# Patient Record
Sex: FEMALE | Race: WHITE | NOT HISPANIC OR LATINO | Employment: OTHER | URBAN - METROPOLITAN AREA
[De-identification: names, ages, dates, MRNs, and addresses within clinical notes are randomized per-mention and may not be internally consistent; named-entity substitution may affect disease eponyms.]

---

## 2017-01-12 ENCOUNTER — HOSPITAL ENCOUNTER (OUTPATIENT)
Dept: SLEEP MEDICINE | Facility: HOSPITAL | Age: 57
Discharge: HOME | End: 2017-01-12
Attending: INTERNAL MEDICINE | Admitting: INTERNAL MEDICINE

## 2017-01-17 ENCOUNTER — HOSPITAL ENCOUNTER (OUTPATIENT)
Dept: RESPIRATORY THERAPY | Facility: HOSPITAL | Age: 57
Discharge: HOME | End: 2017-01-17
Attending: INTERNAL MEDICINE | Admitting: INTERNAL MEDICINE

## 2017-04-17 ENCOUNTER — HOSPITAL ENCOUNTER (OUTPATIENT)
Dept: SLEEP MEDICINE | Facility: HOSPITAL | Age: 57
Discharge: HOME | End: 2017-04-17
Attending: INTERNAL MEDICINE | Admitting: INTERNAL MEDICINE

## 2017-07-17 ENCOUNTER — HOSPITAL ENCOUNTER (OUTPATIENT)
Dept: SLEEP MEDICINE | Facility: HOSPITAL | Age: 57
Discharge: HOME | End: 2017-07-17
Attending: INTERNAL MEDICINE | Admitting: INTERNAL MEDICINE

## 2018-02-14 ENCOUNTER — HOSPITAL ENCOUNTER (OUTPATIENT)
Dept: PREOP | Facility: HOSPITAL | Age: 58
Setting detail: HOSPITAL OUTPATIENT SURGERY
Discharge: HOME | End: 2018-02-14
Attending: INTERNAL MEDICINE | Admitting: INTERNAL MEDICINE

## 2018-02-14 LAB
AFB STAIN: NORMAL
B PERT DNA SPEC QL NAA+PROBE: NOT DETECTED
BACTERIA SPEC AEROBE CULT: NORMAL
C PNEUM DNA NPH QL NAA+NON-PROBE: NOT DETECTED
CONCENTRATED SMEAR: (no result)
CONV DIRECT SMEAR: NORMAL
CONV GRANULOCYTES, FLUID: 46 %
CONV RESPNL SPECIMEN: NORMAL
FLUAV H1 2009 PAND RNA NPH QL NAA+PROBE: NOT DETECTED
FLUAV H1 HA GENE NPH QL NAA+PROBE: NOT DETECTED
FLUAV H3 RNA NPH QL NAA+PROBE: NOT DETECTED
FLUAV SUBTYP SPEC NAA+PROBE: NOT DETECTED
FLUBV RNA ISLT QL NAA+PROBE: NOT DETECTED
GRAM STN SPEC: NORMAL
HADV DNA SPEC NAA+PROBE: NOT DETECTED
HCOV 229E RNA SPEC QL NAA+PROBE: NOT DETECTED
HCOV HKU1 RNA SPEC QL NAA+PROBE: NOT DETECTED
HCOV NL63 RNA SPEC QL NAA+PROBE: NOT DETECTED
HCOV OC43 RNA SPEC QL NAA+PROBE: NOT DETECTED
HMPV RNA NPH QL NAA+NON-PROBE: NOT DETECTED
HPIV1 RNA ISLT QL NAA+PROBE: NOT DETECTED
HPIV2 RNA SPEC QL NAA+PROBE: NOT DETECTED
HPIV3 RNA NPH QL NAA+PROBE: NOT DETECTED
HPIV4 P GENE NPH QL NAA+PROBE: NOT DETECTED
LYMPHOCYTES NFR FLD MANUAL: 11 %
Lab: NORMAL
M PNEUMO IGG SER IA-ACNC: NOT DETECTED
MESOTHL CELL NFR FLD MANUAL: 43 %
MICRO REPORT STATUS: NORMAL
RHINOVIRUS RNA SPEC NAA+PROBE: NOT DETECTED
RSV RNA NPH QL NAA+NON-PROBE: NOT DETECTED
SPECIMEN SOURCE: (no result)
SPECIMEN SOURCE: NORMAL
WBC # FLD MANUAL: (no result) {CELLS}/UL

## 2020-07-15 ENCOUNTER — HOSPITAL ENCOUNTER (EMERGENCY)
Facility: HOSPITAL | Age: 60
Discharge: HOME | End: 2020-07-15
Attending: EMERGENCY MEDICINE | Admitting: EMERGENCY MEDICINE

## 2020-07-15 ENCOUNTER — APPOINTMENT (OUTPATIENT)
Dept: CT IMAGING | Facility: HOSPITAL | Age: 60
End: 2020-07-15

## 2020-07-15 VITALS
WEIGHT: 83.11 LBS | OXYGEN SATURATION: 96 % | HEIGHT: 62 IN | SYSTOLIC BLOOD PRESSURE: 111 MMHG | RESPIRATION RATE: 18 BRPM | DIASTOLIC BLOOD PRESSURE: 33 MMHG | HEART RATE: 80 BPM | BODY MASS INDEX: 15.29 KG/M2 | TEMPERATURE: 98.9 F

## 2020-07-15 DIAGNOSIS — R11.0 NAUSEA: ICD-10-CM

## 2020-07-15 DIAGNOSIS — R10.84 GENERALIZED ABDOMINAL PAIN: Primary | ICD-10-CM

## 2020-07-15 LAB
ANION GAP SERPL CALCULATED.3IONS-SCNC: 13 MMOL/L (ref 5–15)
BASOPHILS # BLD AUTO: 0.2 10*3/MM3 (ref 0–0.2)
BASOPHILS NFR BLD AUTO: 2.5 % (ref 0–1.5)
BILIRUB UR QL STRIP: NEGATIVE
BUN SERPL-MCNC: 3 MG/DL (ref 8–23)
BUN SERPL-MCNC: ABNORMAL MG/DL
BUN/CREAT SERPL: ABNORMAL
CALCIUM SPEC-SCNC: 9.1 MG/DL (ref 8.6–10.5)
CHLORIDE SERPL-SCNC: 99 MMOL/L (ref 98–107)
CLARITY UR: CLEAR
CO2 SERPL-SCNC: 22 MMOL/L (ref 22–29)
COLOR UR: YELLOW
CREAT SERPL-MCNC: 0.74 MG/DL (ref 0.57–1)
DEPRECATED RDW RBC AUTO: 46.4 FL (ref 37–54)
EOSINOPHIL # BLD AUTO: 0.1 10*3/MM3 (ref 0–0.4)
EOSINOPHIL NFR BLD AUTO: 1.7 % (ref 0.3–6.2)
ERYTHROCYTE [DISTWIDTH] IN BLOOD BY AUTOMATED COUNT: 14.3 % (ref 12.3–15.4)
GFR SERPL CREATININE-BSD FRML MDRD: 80 ML/MIN/1.73
GLUCOSE SERPL-MCNC: 93 MG/DL (ref 65–99)
GLUCOSE UR STRIP-MCNC: NEGATIVE MG/DL
HCT VFR BLD AUTO: 41.4 % (ref 34–46.6)
HGB BLD-MCNC: 13.8 G/DL (ref 12–15.9)
HGB UR QL STRIP.AUTO: NEGATIVE
KETONES UR QL STRIP: NEGATIVE
LEUKOCYTE ESTERASE UR QL STRIP.AUTO: NEGATIVE
LYMPHOCYTES # BLD AUTO: 2.1 10*3/MM3 (ref 0.7–3.1)
LYMPHOCYTES NFR BLD AUTO: 32.3 % (ref 19.6–45.3)
MCH RBC QN AUTO: 31.2 PG (ref 26.6–33)
MCHC RBC AUTO-ENTMCNC: 33.3 G/DL (ref 31.5–35.7)
MCV RBC AUTO: 93.7 FL (ref 79–97)
MONOCYTES # BLD AUTO: 0.5 10*3/MM3 (ref 0.1–0.9)
MONOCYTES NFR BLD AUTO: 7.4 % (ref 5–12)
NEUTROPHILS NFR BLD AUTO: 3.6 10*3/MM3 (ref 1.7–7)
NEUTROPHILS NFR BLD AUTO: 56.1 % (ref 42.7–76)
NITRITE UR QL STRIP: NEGATIVE
NRBC BLD AUTO-RTO: 0.1 /100 WBC (ref 0–0.2)
PH UR STRIP.AUTO: 6.5 [PH] (ref 5–8)
PLATELET # BLD AUTO: 332 10*3/MM3 (ref 140–450)
PMV BLD AUTO: 7.2 FL (ref 6–12)
POTASSIUM SERPL-SCNC: 4.4 MMOL/L (ref 3.5–5.2)
PROT UR QL STRIP: NEGATIVE
RBC # BLD AUTO: 4.42 10*6/MM3 (ref 3.77–5.28)
SODIUM SERPL-SCNC: 134 MMOL/L (ref 136–145)
SP GR UR STRIP: 1.01 (ref 1–1.03)
UROBILINOGEN UR QL STRIP: NORMAL
WBC # BLD AUTO: 6.4 10*3/MM3 (ref 3.4–10.8)

## 2020-07-15 PROCEDURE — 81003 URINALYSIS AUTO W/O SCOPE: CPT | Performed by: EMERGENCY MEDICINE

## 2020-07-15 PROCEDURE — 85025 COMPLETE CBC W/AUTO DIFF WBC: CPT | Performed by: EMERGENCY MEDICINE

## 2020-07-15 PROCEDURE — 80048 BASIC METABOLIC PNL TOTAL CA: CPT | Performed by: EMERGENCY MEDICINE

## 2020-07-15 PROCEDURE — 74176 CT ABD & PELVIS W/O CONTRAST: CPT

## 2020-07-15 PROCEDURE — 99283 EMERGENCY DEPT VISIT LOW MDM: CPT

## 2020-07-15 RX ORDER — NAPROXEN 375 MG/1
375 TABLET ORAL 2 TIMES DAILY PRN
Qty: 14 TABLET | Refills: 0 | Status: SHIPPED | OUTPATIENT
Start: 2020-07-15 | End: 2022-12-05

## 2020-07-15 RX ORDER — SODIUM CHLORIDE 0.9 % (FLUSH) 0.9 %
10 SYRINGE (ML) INJECTION AS NEEDED
Status: DISCONTINUED | OUTPATIENT
Start: 2020-07-15 | End: 2020-07-16 | Stop reason: HOSPADM

## 2020-07-16 NOTE — ED PROVIDER NOTES
Subjective   Patient is a 59-year-old female complaining of low abdominal pain.  She states she has had over 24 hours.  The pain was initially intense but is very mild now.  She has mild nausea but is improved patient has cough fever Bondar other complaint          Review of Systems  For headache ears no cough fever chest pain shortness of breath vomiting diarrhea dysuria or other complaint  Past Medical History:   Diagnosis Date   • COPD (chronic obstructive pulmonary disease) (CMS/HCC)        No Known Allergies    Past Surgical History:   Procedure Laterality Date   •  SECTION     • SPLENECTOMY         History reviewed. No pertinent family history.    Social History     Socioeconomic History   • Marital status:      Spouse name: Not on file   • Number of children: Not on file   • Years of education: Not on file   • Highest education level: Not on file   Tobacco Use   • Smoking status: Never Smoker   Substance and Sexual Activity   • Alcohol use: Not Currently   • Drug use: Not Currently   • Sexual activity: Defer           Objective   Physical Exam  HEENT exam shows TMs to be clear.  Oropharynx comers but sclerae nonicteric.  Neck has no adenopathy JVD or bruits.  Lungs are clear.  Heart has regular rate rhythm without murmur or gallop.  Chest is nontender.  Abdomen soft nontender.  Extremity exam is unremarkable.  Procedures           ED Course            Results for orders placed or performed during the hospital encounter of 07/15/20   Basic Metabolic Panel   Result Value Ref Range    Glucose 93 65 - 99 mg/dL    BUN      Creatinine 0.74 0.57 - 1.00 mg/dL    Sodium 134 (L) 136 - 145 mmol/L    Potassium 4.4 3.5 - 5.2 mmol/L    Chloride 99 98 - 107 mmol/L    CO2 22.0 22.0 - 29.0 mmol/L    Calcium 9.1 8.6 - 10.5 mg/dL    eGFR Non African Amer 80 >60 mL/min/1.73    BUN/Creatinine Ratio      Anion Gap 13.0 5.0 - 15.0 mmol/L   Urinalysis With Culture If Indicated - Urine, Catheter   Result Value Ref  Range    Color, UA Yellow Yellow, Straw    Appearance, UA Clear Clear    pH, UA 6.5 5.0 - 8.0    Specific Gravity, UA 1.008 1.005 - 1.030    Glucose, UA Negative Negative    Ketones, UA Negative Negative    Bilirubin, UA Negative Negative    Blood, UA Negative Negative    Protein, UA Negative Negative    Leuk Esterase, UA Negative Negative    Nitrite, UA Negative Negative    Urobilinogen, UA 0.2 E.U./dL 0.2 - 1.0 E.U./dL   CBC Auto Differential   Result Value Ref Range    WBC 6.40 3.40 - 10.80 10*3/mm3    RBC 4.42 3.77 - 5.28 10*6/mm3    Hemoglobin 13.8 12.0 - 15.9 g/dL    Hematocrit 41.4 34.0 - 46.6 %    MCV 93.7 79.0 - 97.0 fL    MCH 31.2 26.6 - 33.0 pg    MCHC 33.3 31.5 - 35.7 g/dL    RDW 14.3 12.3 - 15.4 %    RDW-SD 46.4 37.0 - 54.0 fl    MPV 7.2 6.0 - 12.0 fL    Platelets 332 140 - 450 10*3/mm3    Neutrophil % 56.1 42.7 - 76.0 %    Lymphocyte % 32.3 19.6 - 45.3 %    Monocyte % 7.4 5.0 - 12.0 %    Eosinophil % 1.7 0.3 - 6.2 %    Basophil % 2.5 (H) 0.0 - 1.5 %    Neutrophils, Absolute 3.60 1.70 - 7.00 10*3/mm3    Lymphocytes, Absolute 2.10 0.70 - 3.10 10*3/mm3    Monocytes, Absolute 0.50 0.10 - 0.90 10*3/mm3    Eosinophils, Absolute 0.10 0.00 - 0.40 10*3/mm3    Basophils, Absolute 0.20 0.00 - 0.20 10*3/mm3    nRBC 0.1 0.0 - 0.2 /100 WBC   BUN   Result Value Ref Range    BUN 3 (L) 8 - 23 mg/dL     Ct Abdomen Pelvis Without Contrast    Result Date: 7/15/2020  1. No evidence of urolithiasis or hydronephrosis. 2. No evidence of bowel obstruction or acute bowel inflammation. 3. Mild colonic stool burden. 4. Splenosis within the left upper quadrant.    Electronically Signed By-Joey Suarez On:7/15/2020 9:38 PM This report was finalized on [id number] by  Joey Suarez, .                                      MDM  Number of Diagnoses or Management Options  Diagnosis management comments: Patient has a benign physical exam.  She has no evidence acute infectious process or metabolic abnormality.  Reexam she is  nearly pain-free.  She will be discharged with placed on Naprosyn.  She will follow with MD for recheck as needed.    Risk of Complications, Morbidity, and/or Mortality  Presenting problems: high  Diagnostic procedures: high  Management options: high    Patient Progress  Patient progress: stable      Final diagnoses:   Generalized abdominal pain   Nausea            Matt Wright MD  07/15/20 [id number]

## 2020-08-27 RX ORDER — METOPROLOL SUCCINATE 25 MG/1
TABLET, EXTENDED RELEASE ORAL
Qty: 90 TABLET | Refills: 1 | Status: SHIPPED | OUTPATIENT
Start: 2020-08-27

## 2021-02-22 RX ORDER — METOPROLOL SUCCINATE 25 MG/1
TABLET, EXTENDED RELEASE ORAL
Qty: 90 TABLET | Refills: 1 | OUTPATIENT
Start: 2021-02-22

## 2022-12-04 ENCOUNTER — APPOINTMENT (OUTPATIENT)
Dept: GENERAL RADIOLOGY | Facility: HOSPITAL | Age: 62
End: 2022-12-04

## 2022-12-04 ENCOUNTER — HOSPITAL ENCOUNTER (INPATIENT)
Facility: HOSPITAL | Age: 62
LOS: 2 days | Discharge: HOME | End: 2022-12-07
Attending: EMERGENCY MEDICINE | Admitting: INTERNAL MEDICINE

## 2022-12-04 DIAGNOSIS — J44.1 COPD WITH ACUTE EXACERBATION: Primary | ICD-10-CM

## 2022-12-04 DIAGNOSIS — R09.02 HYPOXIA: ICD-10-CM

## 2022-12-04 DIAGNOSIS — T17.998A MUCUS PLUG IN RESPIRATORY TRACT: ICD-10-CM

## 2022-12-04 LAB
ALBUMIN SERPL-MCNC: 3.7 G/DL (ref 3.5–5.2)
ALBUMIN/GLOB SERPL: 1.4 G/DL
ALP SERPL-CCNC: 57 U/L (ref 39–117)
ALT SERPL W P-5'-P-CCNC: 16 U/L (ref 1–33)
ANION GAP SERPL CALCULATED.3IONS-SCNC: 10 MMOL/L (ref 5–15)
ARTERIAL PATENCY WRIST A: POSITIVE
AST SERPL-CCNC: 24 U/L (ref 1–32)
ATMOSPHERIC PRESS: ABNORMAL MM[HG]
B PARAPERT DNA SPEC QL NAA+PROBE: NOT DETECTED
B PERT DNA SPEC QL NAA+PROBE: NOT DETECTED
BASE EXCESS BLDA CALC-SCNC: 2.2 MMOL/L (ref 0–3)
BASOPHILS # BLD AUTO: 0.1 10*3/MM3 (ref 0–0.2)
BASOPHILS NFR BLD AUTO: 0.7 % (ref 0–1.5)
BDY SITE: ABNORMAL
BILIRUB SERPL-MCNC: <0.2 MG/DL (ref 0–1.2)
BUN SERPL-MCNC: 8 MG/DL (ref 8–23)
BUN/CREAT SERPL: 11.4 (ref 7–25)
C PNEUM DNA NPH QL NAA+NON-PROBE: NOT DETECTED
CALCIUM SPEC-SCNC: 8.4 MG/DL (ref 8.6–10.5)
CHLORIDE SERPL-SCNC: 100 MMOL/L (ref 98–107)
CO2 BLDA-SCNC: 28.5 MMOL/L (ref 22–29)
CO2 SERPL-SCNC: 25 MMOL/L (ref 22–29)
CREAT SERPL-MCNC: 0.7 MG/DL (ref 0.57–1)
DEPRECATED RDW RBC AUTO: 48.6 FL (ref 37–54)
EGFRCR SERPLBLD CKD-EPI 2021: 97.3 ML/MIN/1.73
EOSINOPHIL # BLD AUTO: 0 10*3/MM3 (ref 0–0.4)
EOSINOPHIL NFR BLD AUTO: 0.3 % (ref 0.3–6.2)
ERYTHROCYTE [DISTWIDTH] IN BLOOD BY AUTOMATED COUNT: 14.2 % (ref 12.3–15.4)
FLUAV H1 2009 PAND RNA NPH QL NAA+PROBE: NOT DETECTED
FLUAV H1 HA GENE NPH QL NAA+PROBE: NOT DETECTED
FLUAV H3 RNA NPH QL NAA+PROBE: NOT DETECTED
FLUAV SUBTYP SPEC NAA+PROBE: NOT DETECTED
FLUBV RNA ISLT QL NAA+PROBE: NOT DETECTED
GLOBULIN UR ELPH-MCNC: 2.6 GM/DL
GLUCOSE SERPL-MCNC: 102 MG/DL (ref 65–99)
HADV DNA SPEC NAA+PROBE: NOT DETECTED
HCO3 BLDA-SCNC: 27.2 MMOL/L (ref 21–28)
HCOV 229E RNA SPEC QL NAA+PROBE: NOT DETECTED
HCOV HKU1 RNA SPEC QL NAA+PROBE: NOT DETECTED
HCOV NL63 RNA SPEC QL NAA+PROBE: NOT DETECTED
HCOV OC43 RNA SPEC QL NAA+PROBE: NOT DETECTED
HCT VFR BLD AUTO: 42.1 % (ref 34–46.6)
HEMODILUTION: NO
HGB BLD-MCNC: 14.1 G/DL (ref 12–15.9)
HMPV RNA NPH QL NAA+NON-PROBE: NOT DETECTED
HPIV1 RNA ISLT QL NAA+PROBE: NOT DETECTED
HPIV2 RNA SPEC QL NAA+PROBE: NOT DETECTED
HPIV3 RNA NPH QL NAA+PROBE: NOT DETECTED
HPIV4 P GENE NPH QL NAA+PROBE: NOT DETECTED
INHALED O2 CONCENTRATION: 32 %
LYMPHOCYTES # BLD AUTO: 1.3 10*3/MM3 (ref 0.7–3.1)
LYMPHOCYTES NFR BLD AUTO: 16.3 % (ref 19.6–45.3)
M PNEUMO IGG SER IA-ACNC: NOT DETECTED
MCH RBC QN AUTO: 31.4 PG (ref 26.6–33)
MCHC RBC AUTO-ENTMCNC: 33.5 G/DL (ref 31.5–35.7)
MCV RBC AUTO: 93.7 FL (ref 79–97)
MODALITY: ABNORMAL
MONOCYTES # BLD AUTO: 0.6 10*3/MM3 (ref 0.1–0.9)
MONOCYTES NFR BLD AUTO: 7 % (ref 5–12)
NEUTROPHILS NFR BLD AUTO: 6.2 10*3/MM3 (ref 1.7–7)
NEUTROPHILS NFR BLD AUTO: 75.7 % (ref 42.7–76)
NRBC BLD AUTO-RTO: 0.2 /100 WBC (ref 0–0.2)
NT-PROBNP SERPL-MCNC: 171.6 PG/ML (ref 0–900)
PCO2 BLDA: 42.7 MM HG (ref 35–48)
PH BLDA: 7.41 PH UNITS (ref 7.35–7.45)
PLATELET # BLD AUTO: 284 10*3/MM3 (ref 140–450)
PMV BLD AUTO: 7.4 FL (ref 6–12)
PO2 BLDA: 74.2 MM HG (ref 83–108)
POTASSIUM SERPL-SCNC: 4.4 MMOL/L (ref 3.5–5.2)
PROT SERPL-MCNC: 6.3 G/DL (ref 6–8.5)
RBC # BLD AUTO: 4.49 10*6/MM3 (ref 3.77–5.28)
RHINOVIRUS RNA SPEC NAA+PROBE: NOT DETECTED
RSV RNA NPH QL NAA+NON-PROBE: NOT DETECTED
SAO2 % BLDCOA: 94.8 % (ref 94–98)
SARS-COV-2 RNA NPH QL NAA+NON-PROBE: NOT DETECTED
SODIUM SERPL-SCNC: 135 MMOL/L (ref 136–145)
TROPONIN T SERPL-MCNC: <0.01 NG/ML (ref 0–0.03)
WBC NRBC COR # BLD: 8.2 10*3/MM3 (ref 3.4–10.8)

## 2022-12-04 PROCEDURE — 99285 EMERGENCY DEPT VISIT HI MDM: CPT

## 2022-12-04 PROCEDURE — 82803 BLOOD GASES ANY COMBINATION: CPT

## 2022-12-04 PROCEDURE — 94799 UNLISTED PULMONARY SVC/PX: CPT

## 2022-12-04 PROCEDURE — 0202U NFCT DS 22 TRGT SARS-COV-2: CPT | Performed by: PHYSICIAN ASSISTANT

## 2022-12-04 PROCEDURE — 93005 ELECTROCARDIOGRAM TRACING: CPT | Performed by: EMERGENCY MEDICINE

## 2022-12-04 PROCEDURE — 71045 X-RAY EXAM CHEST 1 VIEW: CPT

## 2022-12-04 PROCEDURE — 94640 AIRWAY INHALATION TREATMENT: CPT

## 2022-12-04 PROCEDURE — 83880 ASSAY OF NATRIURETIC PEPTIDE: CPT | Performed by: PHYSICIAN ASSISTANT

## 2022-12-04 PROCEDURE — 84484 ASSAY OF TROPONIN QUANT: CPT | Performed by: PHYSICIAN ASSISTANT

## 2022-12-04 PROCEDURE — 80053 COMPREHEN METABOLIC PANEL: CPT | Performed by: PHYSICIAN ASSISTANT

## 2022-12-04 PROCEDURE — 36600 WITHDRAWAL OF ARTERIAL BLOOD: CPT

## 2022-12-04 PROCEDURE — 85025 COMPLETE CBC W/AUTO DIFF WBC: CPT | Performed by: PHYSICIAN ASSISTANT

## 2022-12-04 PROCEDURE — 25010000002 METHYLPREDNISOLONE PER 125 MG: Performed by: PHYSICIAN ASSISTANT

## 2022-12-04 RX ORDER — SODIUM CHLORIDE 0.9 % (FLUSH) 0.9 %
10 SYRINGE (ML) INJECTION AS NEEDED
Status: DISCONTINUED | OUTPATIENT
Start: 2022-12-04 | End: 2022-12-07 | Stop reason: HOSPADM

## 2022-12-04 RX ORDER — METHYLPREDNISOLONE SODIUM SUCCINATE 125 MG/2ML
125 INJECTION, POWDER, LYOPHILIZED, FOR SOLUTION INTRAMUSCULAR; INTRAVENOUS ONCE
Status: COMPLETED | OUTPATIENT
Start: 2022-12-04 | End: 2022-12-04

## 2022-12-04 RX ORDER — ALBUTEROL SULFATE 90 UG/1
2 AEROSOL, METERED RESPIRATORY (INHALATION) ONCE
Status: DISCONTINUED | OUTPATIENT
Start: 2022-12-04 | End: 2022-12-07 | Stop reason: HOSPADM

## 2022-12-04 RX ORDER — ALBUTEROL SULFATE 2.5 MG/3ML
2.5 SOLUTION RESPIRATORY (INHALATION) ONCE
Status: COMPLETED | OUTPATIENT
Start: 2022-12-04 | End: 2022-12-04

## 2022-12-04 RX ADMIN — ALBUTEROL SULFATE 2.5 MG: 2.5 SOLUTION RESPIRATORY (INHALATION) at 23:58

## 2022-12-04 RX ADMIN — METHYLPREDNISOLONE SODIUM SUCCINATE 125 MG: 125 INJECTION, POWDER, FOR SOLUTION INTRAMUSCULAR; INTRAVENOUS at 22:40

## 2022-12-05 PROBLEM — J96.20 ACUTE-ON-CHRONIC RESPIRATORY FAILURE (HCC): Status: ACTIVE | Noted: 2022-12-05

## 2022-12-05 PROBLEM — F32.A MILD DEPRESSION: Chronic | Status: ACTIVE | Noted: 2022-12-05

## 2022-12-05 PROBLEM — J44.1 COPD WITH ACUTE EXACERBATION: Status: ACTIVE | Noted: 2022-12-05

## 2022-12-05 PROBLEM — J96.21 ACUTE ON CHRONIC RESPIRATORY FAILURE WITH HYPOXIA (HCC): Status: ACTIVE | Noted: 2022-12-05

## 2022-12-05 PROBLEM — J96.20 ACUTE-ON-CHRONIC RESPIRATORY FAILURE (HCC): Status: RESOLVED | Noted: 2022-12-05 | Resolved: 2022-12-05

## 2022-12-05 PROBLEM — R63.6 UNDERWEIGHT: Status: ACTIVE | Noted: 2022-12-05

## 2022-12-05 PROBLEM — I10 HYPERTENSION: Status: ACTIVE | Noted: 2022-12-05

## 2022-12-05 PROBLEM — T17.998A MUCUS PLUG IN RESPIRATORY TRACT: Status: ACTIVE | Noted: 2022-12-04

## 2022-12-05 LAB
ANION GAP SERPL CALCULATED.3IONS-SCNC: 12 MMOL/L (ref 5–15)
BASOPHILS # BLD AUTO: 0 10*3/MM3 (ref 0–0.2)
BASOPHILS NFR BLD AUTO: 0.2 % (ref 0–1.5)
BUN SERPL-MCNC: 6 MG/DL (ref 8–23)
BUN/CREAT SERPL: 9.4 (ref 7–25)
CALCIUM SPEC-SCNC: 8.7 MG/DL (ref 8.6–10.5)
CHLORIDE SERPL-SCNC: 97 MMOL/L (ref 98–107)
CO2 SERPL-SCNC: 25 MMOL/L (ref 22–29)
CREAT SERPL-MCNC: 0.64 MG/DL (ref 0.57–1)
DEPRECATED RDW RBC AUTO: 45.1 FL (ref 37–54)
EGFRCR SERPLBLD CKD-EPI 2021: 99.4 ML/MIN/1.73
EOSINOPHIL # BLD AUTO: 0 10*3/MM3 (ref 0–0.4)
EOSINOPHIL NFR BLD AUTO: 0 % (ref 0.3–6.2)
ERYTHROCYTE [DISTWIDTH] IN BLOOD BY AUTOMATED COUNT: 13.8 % (ref 12.3–15.4)
GLUCOSE SERPL-MCNC: 160 MG/DL (ref 65–99)
HCT VFR BLD AUTO: 43.9 % (ref 34–46.6)
HGB BLD-MCNC: 14.2 G/DL (ref 12–15.9)
LYMPHOCYTES # BLD AUTO: 0.6 10*3/MM3 (ref 0.7–3.1)
LYMPHOCYTES NFR BLD AUTO: 5.7 % (ref 19.6–45.3)
MCH RBC QN AUTO: 30.9 PG (ref 26.6–33)
MCHC RBC AUTO-ENTMCNC: 32.4 G/DL (ref 31.5–35.7)
MCV RBC AUTO: 95.5 FL (ref 79–97)
MONOCYTES # BLD AUTO: 0.2 10*3/MM3 (ref 0.1–0.9)
MONOCYTES NFR BLD AUTO: 1.6 % (ref 5–12)
NEUTROPHILS NFR BLD AUTO: 9 10*3/MM3 (ref 1.7–7)
NEUTROPHILS NFR BLD AUTO: 92.5 % (ref 42.7–76)
NRBC BLD AUTO-RTO: 0 /100 WBC (ref 0–0.2)
PLATELET # BLD AUTO: 291 10*3/MM3 (ref 140–450)
PMV BLD AUTO: 8.2 FL (ref 6–12)
POTASSIUM SERPL-SCNC: 4.5 MMOL/L (ref 3.5–5.2)
RBC # BLD AUTO: 4.59 10*6/MM3 (ref 3.77–5.28)
SODIUM SERPL-SCNC: 134 MMOL/L (ref 136–145)
WBC NRBC COR # BLD: 9.8 10*3/MM3 (ref 3.4–10.8)

## 2022-12-05 PROCEDURE — 80048 BASIC METABOLIC PNL TOTAL CA: CPT

## 2022-12-05 PROCEDURE — 85025 COMPLETE CBC W/AUTO DIFF WBC: CPT

## 2022-12-05 PROCEDURE — 25010000002 METHYLPREDNISOLONE PER 40 MG

## 2022-12-05 PROCEDURE — 36415 COLL VENOUS BLD VENIPUNCTURE: CPT

## 2022-12-05 PROCEDURE — 94761 N-INVAS EAR/PLS OXIMETRY MLT: CPT

## 2022-12-05 PROCEDURE — 25010000002 HEPARIN (PORCINE) PER 1000 UNITS

## 2022-12-05 PROCEDURE — 94799 UNLISTED PULMONARY SVC/PX: CPT

## 2022-12-05 RX ORDER — DOXYCYCLINE 100 MG/1
100 TABLET ORAL EVERY 12 HOURS SCHEDULED
Status: DISCONTINUED | OUTPATIENT
Start: 2022-12-05 | End: 2022-12-07 | Stop reason: HOSPADM

## 2022-12-05 RX ORDER — CITALOPRAM 20 MG/1
40 TABLET ORAL DAILY
Status: DISCONTINUED | OUTPATIENT
Start: 2022-12-05 | End: 2022-12-07 | Stop reason: HOSPADM

## 2022-12-05 RX ORDER — SODIUM CHLORIDE 0.9 % (FLUSH) 0.9 %
10 SYRINGE (ML) INJECTION EVERY 12 HOURS SCHEDULED
Status: DISCONTINUED | OUTPATIENT
Start: 2022-12-05 | End: 2022-12-07 | Stop reason: HOSPADM

## 2022-12-05 RX ORDER — HEPARIN SODIUM 5000 [USP'U]/ML
5000 INJECTION, SOLUTION INTRAVENOUS; SUBCUTANEOUS EVERY 12 HOURS SCHEDULED
Status: DISCONTINUED | OUTPATIENT
Start: 2022-12-05 | End: 2022-12-07 | Stop reason: HOSPADM

## 2022-12-05 RX ORDER — ACETAMINOPHEN 325 MG/1
650 TABLET ORAL EVERY 4 HOURS PRN
Status: DISCONTINUED | OUTPATIENT
Start: 2022-12-05 | End: 2022-12-07 | Stop reason: HOSPADM

## 2022-12-05 RX ORDER — CHOLECALCIFEROL (VITAMIN D3) 125 MCG
5 CAPSULE ORAL NIGHTLY PRN
Status: DISCONTINUED | OUTPATIENT
Start: 2022-12-05 | End: 2022-12-07 | Stop reason: HOSPADM

## 2022-12-05 RX ORDER — ACETAMINOPHEN 160 MG/5ML
650 SOLUTION ORAL EVERY 4 HOURS PRN
Status: DISCONTINUED | OUTPATIENT
Start: 2022-12-05 | End: 2022-12-07 | Stop reason: HOSPADM

## 2022-12-05 RX ORDER — BISACODYL 10 MG
10 SUPPOSITORY, RECTAL RECTAL DAILY PRN
Status: DISCONTINUED | OUTPATIENT
Start: 2022-12-05 | End: 2022-12-07 | Stop reason: HOSPADM

## 2022-12-05 RX ORDER — ONDANSETRON 4 MG/1
4 TABLET, FILM COATED ORAL EVERY 6 HOURS PRN
Status: DISCONTINUED | OUTPATIENT
Start: 2022-12-05 | End: 2022-12-07 | Stop reason: HOSPADM

## 2022-12-05 RX ORDER — SODIUM CHLORIDE 9 MG/ML
40 INJECTION, SOLUTION INTRAVENOUS AS NEEDED
Status: DISCONTINUED | OUTPATIENT
Start: 2022-12-05 | End: 2022-12-07 | Stop reason: HOSPADM

## 2022-12-05 RX ORDER — METHYLPREDNISOLONE SODIUM SUCCINATE 40 MG/ML
40 INJECTION, POWDER, LYOPHILIZED, FOR SOLUTION INTRAMUSCULAR; INTRAVENOUS DAILY
Status: DISCONTINUED | OUTPATIENT
Start: 2022-12-05 | End: 2022-12-07

## 2022-12-05 RX ORDER — IPRATROPIUM BROMIDE AND ALBUTEROL SULFATE 2.5; .5 MG/3ML; MG/3ML
3 SOLUTION RESPIRATORY (INHALATION) EVERY 4 HOURS PRN
Status: DISCONTINUED | OUTPATIENT
Start: 2022-12-05 | End: 2022-12-07 | Stop reason: HOSPADM

## 2022-12-05 RX ORDER — AMOXICILLIN 250 MG
2 CAPSULE ORAL 2 TIMES DAILY
Status: DISCONTINUED | OUTPATIENT
Start: 2022-12-05 | End: 2022-12-07 | Stop reason: HOSPADM

## 2022-12-05 RX ORDER — SODIUM CHLORIDE 0.9 % (FLUSH) 0.9 %
10 SYRINGE (ML) INJECTION AS NEEDED
Status: DISCONTINUED | OUTPATIENT
Start: 2022-12-05 | End: 2022-12-07 | Stop reason: HOSPADM

## 2022-12-05 RX ORDER — NITROGLYCERIN 0.4 MG/1
0.4 TABLET SUBLINGUAL
Status: DISCONTINUED | OUTPATIENT
Start: 2022-12-05 | End: 2022-12-07 | Stop reason: HOSPADM

## 2022-12-05 RX ORDER — POLYETHYLENE GLYCOL 3350 17 G/17G
17 POWDER, FOR SOLUTION ORAL DAILY PRN
Status: DISCONTINUED | OUTPATIENT
Start: 2022-12-05 | End: 2022-12-07 | Stop reason: HOSPADM

## 2022-12-05 RX ORDER — CITALOPRAM 40 MG/1
40 TABLET ORAL DAILY
COMMUNITY

## 2022-12-05 RX ORDER — BUDESONIDE AND FORMOTEROL FUMARATE DIHYDRATE 160; 4.5 UG/1; UG/1
2 AEROSOL RESPIRATORY (INHALATION)
Status: DISCONTINUED | OUTPATIENT
Start: 2022-12-05 | End: 2022-12-07 | Stop reason: HOSPADM

## 2022-12-05 RX ORDER — BISACODYL 5 MG/1
5 TABLET, DELAYED RELEASE ORAL DAILY PRN
Status: DISCONTINUED | OUTPATIENT
Start: 2022-12-05 | End: 2022-12-07 | Stop reason: HOSPADM

## 2022-12-05 RX ORDER — ONDANSETRON 2 MG/ML
4 INJECTION INTRAMUSCULAR; INTRAVENOUS EVERY 6 HOURS PRN
Status: DISCONTINUED | OUTPATIENT
Start: 2022-12-05 | End: 2022-12-07 | Stop reason: HOSPADM

## 2022-12-05 RX ORDER — ACETAMINOPHEN 650 MG/1
650 SUPPOSITORY RECTAL EVERY 4 HOURS PRN
Status: DISCONTINUED | OUTPATIENT
Start: 2022-12-05 | End: 2022-12-07 | Stop reason: HOSPADM

## 2022-12-05 RX ADMIN — Medication 10 ML: at 08:49

## 2022-12-05 RX ADMIN — Medication 10 ML: at 21:10

## 2022-12-05 RX ADMIN — DOXYCYCLINE 100 MG: 100 TABLET ORAL at 10:10

## 2022-12-05 RX ADMIN — HEPARIN SODIUM 5000 UNITS: 5000 INJECTION INTRAVENOUS; SUBCUTANEOUS at 21:05

## 2022-12-05 RX ADMIN — CITALOPRAM HYDROBROMIDE 40 MG: 20 TABLET ORAL at 21:09

## 2022-12-05 RX ADMIN — DOXYCYCLINE 100 MG: 100 TABLET ORAL at 21:05

## 2022-12-05 RX ADMIN — SENNOSIDES AND DOCUSATE SODIUM 2 TABLET: 50; 8.6 TABLET ORAL at 21:05

## 2022-12-05 RX ADMIN — Medication 10 ML: at 02:39

## 2022-12-05 RX ADMIN — BUDESONIDE AND FORMOTEROL FUMARATE DIHYDRATE 2 PUFF: 160; 4.5 AEROSOL RESPIRATORY (INHALATION) at 07:40

## 2022-12-05 RX ADMIN — HEPARIN SODIUM 5000 UNITS: 5000 INJECTION INTRAVENOUS; SUBCUTANEOUS at 08:44

## 2022-12-05 RX ADMIN — METHYLPREDNISOLONE SODIUM SUCCINATE 40 MG: 40 INJECTION, POWDER, FOR SOLUTION INTRAMUSCULAR; INTRAVENOUS at 08:44

## 2022-12-05 RX ADMIN — SENNOSIDES AND DOCUSATE SODIUM 2 TABLET: 50; 8.6 TABLET ORAL at 08:44

## 2022-12-05 NOTE — H&P
[facility] Medicine Services  History & Physical    Patient Name: Theresa Sargent  : 1960  MRN: [id number]  Primary Care Physician:  Marito Velasquez MD  Date of admission: 2022  Date and Time of Service: 22 at 0100    Subjective      Chief Complaint: Shortness of breath    History of Present Illness: Theresa Sargent is a 62 y.o. female who presented to [facility] on 2022 complaining of shortness of breath which progressively worsened today.  She states that for the last few days she has had a headache, nonproductive cough, fever, chills, generalized weakness and decreased appetite.  She reports that around 6 years ago she was on O2 at 3 L per NC at home but has not needed it for years until today.    In the ED, her SPO2 was 83% on room air which improved to 97% on O2 at 3 L per NC.  An ABG on O2 at 3 L showed a pH of 7.412, PCO2 42.7, PO2 74.2, HCO3 27.2.  Troponin is negative, proBNP 171.6.  All other labs are unremarkable.  Respiratory panel is negative.  Chest x-ray shows no acute pneumonia.  EKG shows sinus rhythm, HR 70.  She is afebrile, blood pressure and heart rate are stable.  She was given Solu-Medrol and a DuoNeb.  Hospitalist was consulted for further care and management.    Review of Systems   Constitutional: Positive for chills, decreased appetite, fever and malaise/fatigue.   HENT: Negative.    Eyes: Negative.    Cardiovascular: Negative.    Respiratory: Positive for cough and shortness of breath.    Hematologic/Lymphatic: Negative.    Skin: Negative.    Musculoskeletal: Negative.    Gastrointestinal: Negative.    Genitourinary: Negative.    Neurological: Negative.    Psychiatric/Behavioral: Negative.    Allergic/Immunologic: Negative.      Personal History     Past Medical History:   Diagnosis Date   • COPD (chronic obstructive pulmonary disease) (HCC)        Past Surgical History:   Procedure Laterality Date   •  SECTION     •  SPLENECTOMY         Family History: family history is not on file. Otherwise pertinent FHx was reviewed and not pertinent to current issue.    Social History:  reports that she has never smoked. She does not have any smokeless tobacco history on file. She reports that she does not currently use alcohol. She reports that she does not currently use drugs.    Home Medications:  Prior to Admission Medications     Prescriptions Last Dose Informant Patient Reported? Taking?    Fluticasone-Umeclidin-Vilant (TRELEGY ELLIPTA IN)   Yes No    Inhale.    metoprolol succinate XL (TOPROL-XL) 25 MG 24 hr tablet   No No    TAKE 1 TABLET AT BEDTIME    naproxen (NAPROSYN) 375 MG tablet   No No    Take 1 tablet by mouth 2 (Two) Times a Day As Needed for Moderate Pain .            Allergies:  No Known Allergies    Objective      Vitals:   Temp:  [98.9 °F (37.2 °C)] 98.9 °F (37.2 °C)  Heart Rate:  [74-84] 84  Resp:  [18-20] 20  BP: ()/(47-83) 95/47  Flow (L/min):  [3] 3    Physical Exam  Vitals and nursing note reviewed.   Constitutional:       Appearance: Normal appearance.   HENT:      Head: Normocephalic and atraumatic.      Nose: Nose normal.      Mouth/Throat:      Mouth: Mucous membranes are moist.   Eyes:      Extraocular Movements: Extraocular movements intact.      Pupils: Pupils are equal, round, and reactive to light.   Cardiovascular:      Rate and Rhythm: Normal rate and regular rhythm.      Pulses: Normal pulses.      Heart sounds: Normal heart sounds.   Pulmonary:      Effort: Pulmonary effort is normal.      Breath sounds: Examination of the right-upper field reveals decreased breath sounds. Examination of the left-upper field reveals rhonchi. Examination of the right-lower field reveals decreased breath sounds. Examination of the left-lower field reveals rhonchi. Decreased breath sounds, wheezing and rhonchi present.   Abdominal:      General: Bowel sounds are normal.      Palpations: Abdomen is soft.    Musculoskeletal:         General: Normal range of motion.      Cervical back: Normal range of motion.   Skin:     General: Skin is warm and dry.   Neurological:      General: No focal deficit present.      Mental Status: She is alert and oriented to person, place, and time. Mental status is at baseline.   Psychiatric:         Mood and Affect: Mood normal.         Behavior: Behavior normal.       Result Review    Result Review:  I have personally reviewed the results from the time of this admission to 12/5/2022 01:48 EST and agree with these findings:  [x]  Laboratory  [x]  Microbiology  [x]  Radiology  []  EKG/Telemetry   []  Cardiology/Vascular   []  Pathology  []  Old records  []  Other:  Most notable findings include: As above      Assessment & Plan        Active Hospital Problems:  Active Hospital Problems    Diagnosis    • **COPD with acute exacerbation (HCC)    • Hypertension    • Acute on chronic respiratory failure with hypoxia (HCC)    • Underweight    • Mild depression      Plan:     COPD exacerbation  -O2 at 3 L per NC  -Chest x-ray reviewed  -WBC WNL, monitor  -Patient received Solu-Medrol in ED  -Solu-Medrol ordered  -DuoNebs as needed  -Symbicort ordered  -Continue home Trelegy Ellipta  -Cough/deep breathe  -Encourage incentive spirometry  -Pulmonology consult    Essential Hypertension  -Controlled  -Monitor BP  -Continue home metoprolol when verified    Depression  -Chronic, stable  -Continue home Celexa when verified      DVT prophylaxis:  Medical DVT prophylaxis orders are present.    CODE STATUS:    Code Status (Patient has no pulse and is not breathing): CPR (Attempt to Resuscitate)  Medical Interventions (Patient has pulse or is breathing): Full Support    Admission Status:  I believe this patient meets inpatient status.    I discussed the patient's findings and my recommendations with patient.    This patient has been examined wearing appropriate Personal Protective Equipment  12/05/22      Signature: Electronically signed by Anastasia Barron DNP, APRN, 12/05/22, 01:48 EST.  [facility] Hospitalist Team

## 2022-12-05 NOTE — PROGRESS NOTES
[facility] Medicine Services Daily Progress Note    Patient Name: Theresa Sargent  : 1960  MRN: [id number]  Primary Care Physician:  Marito Velasquez MD  Date of admission: 2022      Subjective      Brief interim history: 62-year-old frail-appearing female with known history of COPD, uses oxygen intermittently at times, remote history of tobacco use, hypertension, and depression.  Patient was admitted on 2022, presented to [facility] ED complaining of worsening shortness of breath with chest congestion for the past few days.  She endorses nonproductive cough with associated fever, chills, frontal headache, decreased appetite and generalized body ache.  Patient denies any travel or sick contact.  On presentation, she was hypoxic with O2 saturation in the mid 80's, but oxygenation improved on minimal FiO2 at 4 L via NC.  Chest x-ray showed no acute cardiopulmonary process, and respiratory panel is unremarkable.  She was treated with Solu-Medrol, bronchodilators, and empirical antibiotic with doxycycline.  She was seen in consultation by pulmonologist, and plan for bronchoscopy in a.m.     2022: Seen and examined in follow-up.  Feeling slightly better and resting comfortably in bed in no acute respiratory distress.        Objective      Vitals:   Temp:  [97.8 °F (36.6 °C)-98.9 °F (37.2 °C)] 98 °F (36.7 °C)  Heart Rate:  [67-84] 71  Resp:  [18-25] 18  BP: ()/(47-83) 90/49  Flow (L/min):  [3-4] 4    Physical Exam  Constitutional:       General: She is not in acute distress.     Appearance: She is not ill-appearing.      Comments: Frail-appearing female   HENT:      Head: Normocephalic.      Right Ear: Tympanic membrane normal.      Nose: Nose normal.      Mouth/Throat:      Mouth: Mucous membranes are moist.   Cardiovascular:      Rate and Rhythm: Normal rate.      Pulses: Normal pulses.   Pulmonary:      Effort: Pulmonary effort is normal.   Abdominal:       Palpations: Abdomen is soft.   Genitourinary:     Rectum: Normal.   Musculoskeletal:         General: No swelling or tenderness. Normal range of motion.      Cervical back: Neck supple.   Skin:     General: Skin is warm.   Neurological:      General: No focal deficit present.      Mental Status: She is alert.             Result Review    Result Review:  I have personally reviewed the results from the time of this admission to 12/5/2022 12:30 EST and agree with these findings:  []  Laboratory  []  Microbiology  []  Radiology  []  EKG/Telemetry   []  Cardiology/Vascular   []  Pathology  []  Old records  []  Other:  Most notable findings include:           Assessment & Plan        albuterol sulfate HFA, 2 puff, Inhalation, Once  budesonide-formoterol, 2 puff, Inhalation, BID - RT  doxycycline, 100 mg, Oral, Q12H  heparin (porcine), 5,000 Units, Subcutaneous, Q12H  methylPREDNISolone sodium succinate, 40 mg, Intravenous, Daily  senna-docusate sodium, 2 tablet, Oral, BID  sodium chloride, 10 mL, Intravenous, Q12H             Active Hospital Problems:  Active Hospital Problems    Diagnosis    • **COPD with acute exacerbation (HCC)    • Hypertension    • Acute on chronic respiratory failure with hypoxia (HCC)    • Underweight    • Mild depression    • Mucus plug in respiratory tract      Assessment/plan:    Acute hypoxic respiratory failure       -2/2 below    Acute on chronic COPD exacerbation/bronchitis       -Solu-Medrol/bronchodilators/Doxy        -planned bronchoscopy tentatively scheduled for today    Hypertension       -Toprol-XL on hold due to soft BP    Depression       -Celexa at home      DVT prophylaxis:  Medical DVT prophylaxis orders are present.    CODE STATUS:    Code Status (Patient has no pulse and is not breathing): CPR (Attempt to Resuscitate)  Medical Interventions (Patient has pulse or is breathing): Full Support      Disposition:  I expect patient to be discharged.      Electronically signed by  Anjel Alford MD, 12/05/22, 12:30 EST.  [facility]ist Team

## 2022-12-05 NOTE — CONSULTS
Group: [facility]         CONSULT NOTE    Patient Identification:  Theresa Sargent  62 y.o.  female  1960  [id number]            Requesting physician: Attending physician    Reason for Consultation: Acute respiratory failure, COPD exacerbation      History of Present Illness:  62-year-old female with history of COPD who presented 2022 with complaints of progressive shortness of breath for the last few days with nonproductive cough fever chills generalized weakness decreased appetite and headache.  She is to be on oxygen but stopped using it about 6 years ago.  Her sats were reported to be 88% in the ED, currently on 4 L oxygenating 95%  Respiratory panel is negative and chest x-ray reported as having no acute process      Assessment:  COPD with acute exacerbation (HCC)  Hypoxemia  Acute bronchitis  Essential hypertension  History of depression    Recommendations:  Inhaled corticosteroids  Patient is unable to clear secretions: Plan bronchoscopy 2022  IV steroids  P.o. doxycycline  Oxygen supplement and titration to maintain saturation 90 to 95%  Bronchodilatores  DVT/GI prophylaxis  Check daily labs and correct electrolytes as needed      Review of Sytems:  Constitutional: Negative for chills, positive for fever and malaise/fatigue.   HENT: Negative.    Eyes: Negative.    Cardiovascular: Negative.    Respiratory: Positive for cough and shortness of breath.    Skin: Negative.    Musculoskeletal: Negative.    Gastrointestinal: Negative.    Genitourinary: Negative.    Neurological: Negative.    Psychiatric/Behavioral: Negative.    Past Medical History:  Past Medical History:   Diagnosis Date   • COPD (chronic obstructive pulmonary disease) (HCC)        Past Surgical History:  Past Surgical History:   Procedure Laterality Date   •  SECTION     • SPLENECTOMY          Home Meds:  (Not in a hospital admission)      Allergies:  No Known Allergies    Social History:   Social History  "    Socioeconomic History   • Marital status:    Tobacco Use   • Smoking status: Former     Types: Cigarettes   Substance and Sexual Activity   • Alcohol use: Not Currently   • Drug use: Not Currently   • Sexual activity: Defer       Family History:  History reviewed. No pertinent family history.    Physical Exam:  BP 90/49 (BP Location: Left arm, Patient Position: Lying)   Pulse 71   Temp 98 °F (36.7 °C) (Oral)   Resp 18   Ht 157.5 cm (62\")   Wt 43.1 kg (95 lb)   SpO2 95%   BMI 17.38 kg/m²  Body mass index is 17.38 kg/m². 95% 43.1 kg (95 lb)  General Appearance:  Alert   HEENT:  Normocephalic, without obvious abnormality, Conjunctiva/corneas clear,.   Nares normal, no drainage     Neck:  Supple, symmetrical, trachea midline. No JVD.  Lungs /Chest wall:   Bilateral wheezing, respirations unlabored, symmetrical wall movement.     Heart:  Regular rate and rhythm, S1 S2 normal  Abdomen: Soft, non-tender, no masses, no organomegaly.    Extremities: No edema, no clubbing or cyanosis    LABS:  Lab Results   Component Value Date    CALCIUM 8.7 12/05/2022     Results from last 7 days   Lab Units 12/05/22  0555 12/04/22  2223   SODIUM mmol/L 134* 135*   POTASSIUM mmol/L 4.5 4.4   CHLORIDE mmol/L 97* 100   CO2 mmol/L 25.0 25.0   BUN mg/dL 6* 8   CREATININE mg/dL 0.64 0.70   GLUCOSE mg/dL 160* 102*   CALCIUM mg/dL 8.7 8.4*   WBC 10*3/mm3 9.80 8.20   HEMOGLOBIN g/dL 14.2 14.1   PLATELETS 10*3/mm3 291 284   ALT (SGPT) U/L  --  16   AST (SGOT) U/L  --  24   PROBNP pg/mL  --  171.6     Lab Results   Component Value Date    TROPONINT <0.010 12/04/2022     Results from last 7 days   Lab Units 12/04/22  2223   TROPONIN T ng/mL <0.010             Results from last 7 days   Lab Units 12/04/22  2259   PH, ARTERIAL pH units 7.412   PCO2, ARTERIAL mm Hg 42.7   PO2 ART mm Hg 74.2*   O2 SATURATION ART % 94.8   MODALITY  Cannula     Results from last 7 days   Lab Units 12/04/22  2236   ADENOVIRUS DETECTION BY PCR  Not Detected "   CORONAVIRUS 229E  Not Detected   CORONAVIRUS HKU1  Not Detected   CORONAVIRUS NL63  Not Detected   CORONAVIRUS OC43  Not Detected   HUMAN METAPNEUMOVIRUS  Not Detected   HUMAN RHINOVIRUS/ENTEROVIRUS  Not Detected   INFLUENZA B PCR  Not Detected   PARAINFLUENZA 1  Not Detected   PARAINFLUENZA VIRUS 2  Not Detected   PARAINFLUENZA VIRUS 3  Not Detected   PARAINFLUENZA VIRUS 4  Not Detected   BORDETELLA PERTUSSIS PCR  Not Detected   CHLAMYDOPHILA PNEUMONIAE PCR  Not Detected   MYCOPLAMA PNEUMO PCR  Not Detected   INFLUENZA A PCR  Not Detected   INFLUENZA A H3  Not Detected   INFLUENZA A H1  Not Detected   RSV, PCR  Not Detected             Lab Results   Component Value Date    TSH 0.39 10/26/2017     Estimated Creatinine Clearance: 61.2 mL/min (by C-G formula based on SCr of 0.64 mg/dL).         Imaging:  Imaging Results (Last 24 Hours)     Procedure Component Value Units Date/Time    XR Chest 1 View [[id number]] Collected: 12/05/22 0737     Updated: 12/05/22 0740    Narrative:         DATE OF EXAM:   12/4/2022 9:56 PM     PROCEDURE:   XR CHEST 1 VW-     INDICATIONS:   shortness of breath and cough     COMPARISON:  12/05/2017     TECHNIQUE:   Portable chest radiograph.     FINDINGS:    The cardiomediastinal silhouette is within normal limits. The lungs are  clear. There is no pneumothorax, focal consolidation, or large pleural  effusion. Osseous structures grossly intact. Lungs are hyperexpanded  which suggest emphysema.       Impression:      No acute process.      Electronically Signed By-Juan A Whitman MD On:12/5/2022 7:38 AM  This report was finalized on [id number] by  Juan A Whitman MD.            Current Meds:   SCHEDULE  albuterol sulfate HFA, 2 puff, Inhalation, Once  budesonide-formoterol, 2 puff, Inhalation, BID - RT  heparin (porcine), 5,000 Units, Subcutaneous, Q12H  methylPREDNISolone sodium succinate, 40 mg, Intravenous, Daily  senna-docusate sodium, 2 tablet, Oral, BID  sodium chloride, 10 mL,  Intravenous, Q12H      Infusions     PRNs  •  acetaminophen **OR** acetaminophen **OR** acetaminophen  •  senna-docusate sodium **AND** polyethylene glycol **AND** bisacodyl **AND** bisacodyl  •  ipratropium-albuterol  •  melatonin  •  nitroglycerin  •  ondansetron **OR** ondansetron  •  sodium chloride  •  sodium chloride  •  sodium chloride        Vanita Fernandez MD  12/5/2022  08:55 EST      Much of this encounter note is an electronic transcription/translation of spoken language to printed text using Dragon Software.

## 2022-12-05 NOTE — ED PROVIDER NOTES
Subjective   History of Present Illness  61 y/o white female with a PMHx of COPD presents to the ED accompanied by her  with cc of SOA onset earlier today. She reports for the last several days she has had symptoms of HA, chest tightness, non-productive cough, myalgias, fever, chills, generalized weakness, and decreased appetite. Notes having tried Mucinex, Nyquil, DayQuil, and Advil without any relief. She denies having been tested for COVID or flu and any recent sick contacts. Denies N/V and abdominal pain. 6 years ago she use to be on 3L of O2 at home but has not needed until today, was stating 87% on 3L. She is on 3L of O2 here and stats are in the 97%. Reports she normally does not use her albuterol inhaler as well, but has been using it without any relief. Uses Trelegy daily.        Review of Systems   Constitutional: Positive for appetite change, chills and fever. Negative for diaphoresis.   HENT: Negative for congestion, ear pain, rhinorrhea and sore throat.    Respiratory: Positive for cough, chest tightness and shortness of breath.    Cardiovascular: Negative for chest pain.   Gastrointestinal: Negative for abdominal pain, constipation, diarrhea, nausea and vomiting.   Genitourinary: Negative for dysuria and flank pain.   Musculoskeletal: Positive for myalgias. Negative for back pain.   Skin: Negative for color change and rash.   Neurological: Positive for weakness (generalized) and light-headedness. Negative for dizziness and syncope.   Psychiatric/Behavioral: Negative for confusion. The patient is not nervous/anxious.    All other systems reviewed and are negative.      Past Medical History:   Diagnosis Date   • COPD (chronic obstructive pulmonary disease) (HCC)        No Known Allergies    Past Surgical History:   Procedure Laterality Date   •  SECTION     • SPLENECTOMY         History reviewed. No pertinent family history.    Social History     Socioeconomic History   • Marital status:     Tobacco Use   • Smoking status: Never   Substance and Sexual Activity   • Alcohol use: Not Currently   • Drug use: Not Currently   • Sexual activity: Defer           Objective   Physical Exam  Vitals and nursing note reviewed.   Constitutional:       General: She is not in acute distress.     Appearance: She is well-developed. She is not ill-appearing, toxic-appearing or diaphoretic.   HENT:      Head: Normocephalic and atraumatic.      Mouth/Throat:      Mouth: Mucous membranes are moist.      Pharynx: Oropharynx is clear.   Eyes:      General: No scleral icterus.     Extraocular Movements: Extraocular movements intact.      Pupils: Pupils are equal, round, and reactive to light.   Cardiovascular:      Rate and Rhythm: Normal rate and regular rhythm.      Heart sounds: No murmur heard.    No friction rub. No gallop.   Pulmonary:      Effort: Pulmonary effort is normal. No respiratory distress.      Breath sounds: No stridor. Examination of the right-upper field reveals wheezing. Examination of the left-upper field reveals wheezing. Decreased breath sounds, wheezing, rhonchi (diffuse) and rales (diffuse) present.   Chest:      Chest wall: No tenderness.   Abdominal:      General: Bowel sounds are normal. There is no distension. There are no signs of injury.      Palpations: Abdomen is soft. There is no fluid wave or mass.      Tenderness: There is no abdominal tenderness. There is no right CVA tenderness, left CVA tenderness, guarding or rebound.      Hernia: No hernia is present.   Skin:     General: Skin is warm.      Capillary Refill: Capillary refill takes less than 2 seconds.      Coloration: Skin is not cyanotic, jaundiced or pale.      Findings: No rash.   Neurological:      General: No focal deficit present.      Mental Status: She is alert and oriented to person, place, and time.      Cranial Nerves: No cranial nerve deficit.   Psychiatric:         Mood and Affect: Mood normal.         Behavior:  "Behavior normal.         Procedures           ED Course    /83   Pulse 77   Temp 98.9 °F (37.2 °C) (Temporal)   Resp 20   Ht 157.5 cm (62\")   Wt 43.1 kg (95 lb)   SpO2 98%   BMI 17.38 kg/m²   Medications   sodium chloride 0.9 % flush 10 mL (has no administration in time range)   albuterol sulfate HFA (PROVENTIL HFA;VENTOLIN HFA;PROAIR HFA) inhaler 2 puff (2 puffs Inhalation Not Given 12/4/22 2355)   albuterol (PROVENTIL) nebulizer solution 0.083% 2.5 mg/3mL (2.5 mg Nebulization Given 12/4/22 2358)   methylPREDNISolone sodium succinate (SOLU-Medrol) injection 125 mg (125 mg Intravenous Given 12/4/22 2240)     Labs Reviewed   COMPREHENSIVE METABOLIC PANEL - Abnormal; Notable for the following components:       Result Value    Glucose 102 (*)     Sodium 135 (*)     Calcium 8.4 (*)     All other components within normal limits    Narrative:     GFR Normal >60  Chronic Kidney Disease <60  Kidney Failure <15     CBC WITH AUTO DIFFERENTIAL - Abnormal; Notable for the following components:    Lymphocyte % 16.3 (*)     All other components within normal limits   BLOOD GAS, ARTERIAL - Abnormal; Notable for the following components:    pO2, Arterial 74.2 (*)     All other components within normal limits   RESPIRATORY PANEL PCR W/ COVID-19 (SARS-COV-2) MERRY/LAUREN/CARIN/PAD/COR/MAD/JOSE CARLOS IN-HOUSE, NP SWAB IN UTM/VTP, 3-4 HR TAT - Normal    Narrative:     In the setting of a positive respiratory panel with a viral infection PLUS a negative procalcitonin without other underlying concern for bacterial infection, consider observing off antibiotics or discontinuation of antibiotics and continue supportive care. If the respiratory panel is positive for atypical bacterial infection (Bordetella pertussis, Chlamydophila pneumoniae, or Mycoplasma pneumoniae), consider antibiotic de-escalation to target atypical bacterial infection.   BNP (IN-HOUSE) - Normal    Narrative:     Among patients with dyspnea, NT-proBNP is highly sensitive " for the detection of acute congestive heart failure. In addition NT-proBNP of <300 pg/ml effectively rules out acute congestive heart failure with 99% negative predictive value.     TROPONIN (IN-HOUSE) - Normal    Narrative:     Troponin T Reference Range:  <= 0.03 ng/mL-   Negative for AMI  >0.03 ng/mL-     Abnormal for myocardial necrosis.  Clinicians would have to utilize clinical acumen, EKG, Troponin and serial changes to determine if it is an Acute Myocardial Infarction or myocardial injury due to an underlying chronic condition.       Results may be falsely decreased if patient taking Biotin.     BLOOD GAS, ARTERIAL   CBC AND DIFFERENTIAL    Narrative:     The following orders were created for panel order CBC & Differential.  Procedure                               Abnormality         Status                     ---------                               -----------         ------                     CBC Auto Differential[[id number]]        Abnormal            Final result                 Please view results for these tests on the individual orders.     No radiology results for the last day                                         MDM  Number of Diagnoses or Management Options  COPD with acute exacerbation (HCC)  Hypoxia  Diagnosis management comments: Chart Review:  Comorbidity: As per past medical history  Differentials: Pneumonia, COPD exacerbation, pleural effusion, pneumothorax, viral illness, PE      ;this list is not all inclusive and does not constitute the entirety of considered causes  ECG: Reviewed by myself interpreted by  sinus rhythm rate 70 normal EKG previous reviewed from 12/5/2017   Labs: As above  Imaging: Was interpreted by physician and reviewed by myself:  XR Chest 1 View   ED Interpretation    No acute pneumonia noted on chest x-ray reviewed by myself interpreted by Dr. Rodas     Disposition/Treatment:  Appropriate PPE was worn during exam and throughout all encounters with the  patient.  While in the ED IV was placed and labs were obtained patient was placed on proper monitors she was afebrile but was requiring 3 L of oxygen via nasal cannula which is not her baseline.  ABG was obtained on nasal cannula as above.  Patient did have wheezing noted on exam she was given Solu-Medrol breathing treatment while in the ED with slight improvement however she did still have decreased air movement throughout her lungs.  Lab results showed negative respiratory panel.  Troponin and BNP were within normal limits.  CBC showed no signs of severe infection with a normal white count.  Metabolic panel showed glucose 102 sodium 135 calcium 8.4.  Patient symptoms could be secondary to viral illness as patient's  has had similar symptoms including cough and congestion at home and COPD exacerbation.  Patient will be admitted for further evaluation of her hypoxia.  Findings were discussed with the patient and family at bedside who are in agreement with plan.  Spoke to Anastasia MCCOY who agreed for admission through Dr. Anderson.       Amount and/or Complexity of Data Reviewed  Clinical lab tests: reviewed  Tests in the radiology section of CPT®: reviewed  Tests in the medicine section of CPT®: reviewed  Independent visualization of images, tracings, or specimens: yes        Final diagnoses:   COPD with acute exacerbation (HCC)   Hypoxia       ED Disposition  ED Disposition     ED Disposition   Decision to Admit    Condition   --    Comment   Level of Care: Telemetry [5]   Admitting Physician: HALIE ANDERSON [[id number]]   Attending Physician: HALIE ANDERSON [[id number]]               No follow-up provider specified.       Medication List      No changes were made to your prescriptions during this visit.          Phil Jenkins PA  12/05/22 0034

## 2022-12-05 NOTE — CASE MANAGEMENT/SOCIAL WORK
Discharge Planning Assessment  [facility]     Patient Name: Theresa Sargent  MRN: [id number]  Today's Date: 12/5/2022    Admit Date: 12/4/2022    Plan: Return home with spouse,joiehcjulissa for home 02 needs,   Discharge Needs Assessment     Row Name 12/05/22 1554       Living Environment    People in Home spouse    Name(s) of People in Home Spouse-Nj    Current Living Arrangements home    Primary Care Provided by self    Provides Primary Care For no one, unable/limited ability to care for self    Family Caregiver if Needed spouse    Quality of Family Relationships helpful    Able to Return to Prior Arrangements yes       Resource/Environmental Concerns    Resource/Environmental Concerns none    Transportation Concerns none       Transition Planning    Patient/Family Anticipates Transition to home with family    Transportation Anticipated family or friend will provide  Spouse       Discharge Needs Assessment    Readmission Within the Last 30 Days no previous admission in last 30 days    Equipment Currently Used at Home none  Used oxygen in past via [employer]    Concerns to be Addressed discharge planning    Equipment Needed After Discharge none               Discharge Plan     Row Name 12/05/22 1107       Plan    [facility],    Plan Comments Sp[facility] with patient at [facility], IADL's, Confirmed PCP and Pharmacy.No transportaton or prescription coverage issues. Used home 02 in past via [employer]              Demographic Summary     Row Name 12/05/22 3028       General Information    Admission Type inpatient    Arrived From emergency department    Required Notices Provided Important Message from Medicare    Referral Source emergency department    Reason for Consult discharge planning    Preferred Language English               Functional Status     Row Name 12/05/22 1555       Functional Status    Usual Activity Tolerance good    Current Activity Tolerance good       Functional Status,  IADL    Medications independent    Meal Preparation independent    Housekeeping independent    Laundry independent    Shopping independent       Mental Status    General Appearance WDL WDL               Patient Forms     Row Name 12/05/22 1552       Patient Forms    Important Message from Medicare ([facility]) Delivered  12/5  Registration         Met with patient in room wearing PPE: mask, face shield/goggles, gloves, gown.      Maintained distance greater than six feet and spent less than 15 minutes in the room.      Shani Mar RN

## 2022-12-06 ENCOUNTER — ANESTHESIA (OUTPATIENT)
Dept: GASTROENTEROLOGY | Facility: HOSPITAL | Age: 62
End: 2022-12-06

## 2022-12-06 ENCOUNTER — ANESTHESIA EVENT (OUTPATIENT)
Dept: GASTROENTEROLOGY | Facility: HOSPITAL | Age: 62
End: 2022-12-06

## 2022-12-06 ENCOUNTER — APPOINTMENT (OUTPATIENT)
Dept: CT IMAGING | Facility: HOSPITAL | Age: 62
End: 2022-12-06

## 2022-12-06 LAB
ANION GAP SERPL CALCULATED.3IONS-SCNC: 10 MMOL/L (ref 5–15)
B PARAPERT DNA SPEC QL NAA+PROBE: NOT DETECTED
B PERT DNA SPEC QL NAA+PROBE: NOT DETECTED
BASOPHILS # BLD AUTO: 0.1 10*3/MM3 (ref 0–0.2)
BASOPHILS NFR BLD AUTO: 0.8 % (ref 0–1.5)
BUN SERPL-MCNC: 12 MG/DL (ref 8–23)
BUN/CREAT SERPL: 18.2 (ref 7–25)
C PNEUM DNA NPH QL NAA+NON-PROBE: NOT DETECTED
CALCIUM SPEC-SCNC: 8.7 MG/DL (ref 8.6–10.5)
CHLORIDE SERPL-SCNC: 100 MMOL/L (ref 98–107)
CO2 SERPL-SCNC: 26 MMOL/L (ref 22–29)
CREAT SERPL-MCNC: 0.66 MG/DL (ref 0.57–1)
DEPRECATED RDW RBC AUTO: 46.8 FL (ref 37–54)
EGFRCR SERPLBLD CKD-EPI 2021: 98.7 ML/MIN/1.73
EOSINOPHIL # BLD AUTO: 0 10*3/MM3 (ref 0–0.4)
EOSINOPHIL NFR BLD AUTO: 0 % (ref 0.3–6.2)
ERYTHROCYTE [DISTWIDTH] IN BLOOD BY AUTOMATED COUNT: 13.9 % (ref 12.3–15.4)
FLUAV H3 RNA NPH QL NAA+PROBE: DETECTED
FLUBV RNA ISLT QL NAA+PROBE: NOT DETECTED
GLUCOSE SERPL-MCNC: 90 MG/DL (ref 65–99)
HADV DNA SPEC NAA+PROBE: NOT DETECTED
HCOV 229E RNA SPEC QL NAA+PROBE: NOT DETECTED
HCOV HKU1 RNA SPEC QL NAA+PROBE: NOT DETECTED
HCOV NL63 RNA SPEC QL NAA+PROBE: NOT DETECTED
HCOV OC43 RNA SPEC QL NAA+PROBE: NOT DETECTED
HCT VFR BLD AUTO: 38.7 % (ref 34–46.6)
HGB BLD-MCNC: 13 G/DL (ref 12–15.9)
HMPV RNA NPH QL NAA+NON-PROBE: NOT DETECTED
HPIV1 RNA ISLT QL NAA+PROBE: NOT DETECTED
HPIV2 RNA SPEC QL NAA+PROBE: NOT DETECTED
HPIV3 RNA NPH QL NAA+PROBE: NOT DETECTED
HPIV4 P GENE NPH QL NAA+PROBE: NOT DETECTED
LYMPHOCYTES # BLD AUTO: 2.2 10*3/MM3 (ref 0.7–3.1)
LYMPHOCYTES NFR BLD AUTO: 15.8 % (ref 19.6–45.3)
M PNEUMO IGG SER IA-ACNC: NOT DETECTED
MCH RBC QN AUTO: 31.2 PG (ref 26.6–33)
MCHC RBC AUTO-ENTMCNC: 33.6 G/DL (ref 31.5–35.7)
MCV RBC AUTO: 93 FL (ref 79–97)
MONOCYTES # BLD AUTO: 1 10*3/MM3 (ref 0.1–0.9)
MONOCYTES NFR BLD AUTO: 7.1 % (ref 5–12)
NEUTROPHILS NFR BLD AUTO: 10.5 10*3/MM3 (ref 1.7–7)
NEUTROPHILS NFR BLD AUTO: 76.3 % (ref 42.7–76)
NRBC BLD AUTO-RTO: 0.1 /100 WBC (ref 0–0.2)
PLATELET # BLD AUTO: 257 10*3/MM3 (ref 140–450)
PMV BLD AUTO: 8.8 FL (ref 6–12)
POTASSIUM SERPL-SCNC: 4.7 MMOL/L (ref 3.5–5.2)
QT INTERVAL: 411 MS
RBC # BLD AUTO: 4.16 10*6/MM3 (ref 3.77–5.28)
RHINOVIRUS RNA SPEC NAA+PROBE: NOT DETECTED
RSV RNA NPH QL NAA+NON-PROBE: NOT DETECTED
SARS-COV-2 RNA NPH QL NAA+NON-PROBE: NOT DETECTED
SODIUM SERPL-SCNC: 136 MMOL/L (ref 136–145)
WBC NRBC COR # BLD: 13.8 10*3/MM3 (ref 3.4–10.8)

## 2022-12-06 PROCEDURE — 94799 UNLISTED PULMONARY SVC/PX: CPT

## 2022-12-06 PROCEDURE — 88108 CYTOPATH CONCENTRATE TECH: CPT | Performed by: INTERNAL MEDICINE

## 2022-12-06 PROCEDURE — 94664 DEMO&/EVAL PT USE INHALER: CPT

## 2022-12-06 PROCEDURE — 87102 FUNGUS ISOLATION CULTURE: CPT | Performed by: INTERNAL MEDICINE

## 2022-12-06 PROCEDURE — 0202U NFCT DS 22 TRGT SARS-COV-2: CPT | Performed by: INTERNAL MEDICINE

## 2022-12-06 PROCEDURE — 25010000002 METHYLPREDNISOLONE PER 40 MG

## 2022-12-06 PROCEDURE — 87206 SMEAR FLUORESCENT/ACID STAI: CPT | Performed by: INTERNAL MEDICINE

## 2022-12-06 PROCEDURE — 94761 N-INVAS EAR/PLS OXIMETRY MLT: CPT

## 2022-12-06 PROCEDURE — 87070 CULTURE OTHR SPECIMN AEROBIC: CPT | Performed by: INTERNAL MEDICINE

## 2022-12-06 PROCEDURE — 0B9M8ZX DRAINAGE OF BILATERAL LUNGS, VIA NATURAL OR ARTIFICIAL OPENING ENDOSCOPIC, DIAGNOSTIC: ICD-10-PCS | Performed by: INTERNAL MEDICINE

## 2022-12-06 PROCEDURE — 0BCM8ZZ EXTIRPATION OF MATTER FROM BILATERAL LUNGS, VIA NATURAL OR ARTIFICIAL OPENING ENDOSCOPIC: ICD-10-PCS | Performed by: INTERNAL MEDICINE

## 2022-12-06 PROCEDURE — 85025 COMPLETE CBC W/AUTO DIFF WBC: CPT

## 2022-12-06 PROCEDURE — 71250 CT THORAX DX C-: CPT

## 2022-12-06 PROCEDURE — 25010000002 HEPARIN (PORCINE) PER 1000 UNITS

## 2022-12-06 PROCEDURE — 87116 MYCOBACTERIA CULTURE: CPT | Performed by: INTERNAL MEDICINE

## 2022-12-06 PROCEDURE — 97162 PT EVAL MOD COMPLEX 30 MIN: CPT

## 2022-12-06 PROCEDURE — 87205 SMEAR GRAM STAIN: CPT | Performed by: INTERNAL MEDICINE

## 2022-12-06 PROCEDURE — 36415 COLL VENOUS BLD VENIPUNCTURE: CPT

## 2022-12-06 PROCEDURE — 87798 DETECT AGENT NOS DNA AMP: CPT | Performed by: INTERNAL MEDICINE

## 2022-12-06 PROCEDURE — 80048 BASIC METABOLIC PNL TOTAL CA: CPT

## 2022-12-06 PROCEDURE — 25010000002 PROPOFOL 10 MG/ML EMULSION: Performed by: STUDENT IN AN ORGANIZED HEALTH CARE EDUCATION/TRAINING PROGRAM

## 2022-12-06 RX ORDER — IPRATROPIUM BROMIDE AND ALBUTEROL SULFATE 2.5; .5 MG/3ML; MG/3ML
3 SOLUTION RESPIRATORY (INHALATION) ONCE AS NEEDED
Status: CANCELLED | OUTPATIENT
Start: 2022-12-06

## 2022-12-06 RX ORDER — IPRATROPIUM BROMIDE AND ALBUTEROL SULFATE 2.5; .5 MG/3ML; MG/3ML
3 SOLUTION RESPIRATORY (INHALATION)
Status: CANCELLED | OUTPATIENT
Start: 2022-12-06

## 2022-12-06 RX ORDER — SODIUM CHLORIDE 9 MG/ML
INJECTION, SOLUTION INTRAVENOUS CONTINUOUS PRN
Status: DISCONTINUED | OUTPATIENT
Start: 2022-12-06 | End: 2022-12-06 | Stop reason: SURG

## 2022-12-06 RX ORDER — LIDOCAINE HYDROCHLORIDE 20 MG/ML
INJECTION, SOLUTION EPIDURAL; INFILTRATION; INTRACAUDAL; PERINEURAL AS NEEDED
Status: DISCONTINUED | OUTPATIENT
Start: 2022-12-06 | End: 2022-12-06 | Stop reason: SURG

## 2022-12-06 RX ADMIN — SODIUM CHLORIDE: 0.9 INJECTION, SOLUTION INTRAVENOUS at 13:07

## 2022-12-06 RX ADMIN — METHYLPREDNISOLONE SODIUM SUCCINATE 40 MG: 40 INJECTION, POWDER, FOR SOLUTION INTRAMUSCULAR; INTRAVENOUS at 15:53

## 2022-12-06 RX ADMIN — Medication 10 ML: at 15:54

## 2022-12-06 RX ADMIN — IPRATROPIUM BROMIDE AND ALBUTEROL SULFATE 3 ML: 2.5; .5 SOLUTION RESPIRATORY (INHALATION) at 13:23

## 2022-12-06 RX ADMIN — CITALOPRAM HYDROBROMIDE 40 MG: 20 TABLET ORAL at 15:53

## 2022-12-06 RX ADMIN — DOXYCYCLINE 100 MG: 100 TABLET ORAL at 15:53

## 2022-12-06 RX ADMIN — BUDESONIDE AND FORMOTEROL FUMARATE DIHYDRATE 2 PUFF: 160; 4.5 AEROSOL RESPIRATORY (INHALATION) at 07:15

## 2022-12-06 RX ADMIN — PROPOFOL 120 MCG/KG/MIN: 10 INJECTION, EMULSION INTRAVENOUS at 13:10

## 2022-12-06 RX ADMIN — LIDOCAINE HYDROCHLORIDE 60 MG: 20 INJECTION, SOLUTION EPIDURAL; INFILTRATION; INTRACAUDAL; PERINEURAL at 13:10

## 2022-12-06 RX ADMIN — BUDESONIDE AND FORMOTEROL FUMARATE DIHYDRATE 2 PUFF: 160; 4.5 AEROSOL RESPIRATORY (INHALATION) at 19:54

## 2022-12-06 RX ADMIN — HEPARIN SODIUM 5000 UNITS: 5000 INJECTION INTRAVENOUS; SUBCUTANEOUS at 15:53

## 2022-12-06 NOTE — ANESTHESIA PREPROCEDURE EVALUATION
Anesthesia Evaluation     Patient summary reviewed and Nursing notes reviewed   NPO Solid Status: > 8 hours  NPO Liquid Status: > 8 hours           Airway   Mallampati: I  TM distance: >3 FB  Neck ROM: full  No difficulty expected  Dental - normal exam   (+) edentulous    Pulmonary    (+) a smoker Former Abstained day of surgery, COPD severe, home oxygen, decreased breath sounds,   Cardiovascular - normal exam    (+) hypertension well controlled,       Neuro/Psych  (+) psychiatric history Depression,    GI/Hepatic/Renal/Endo - negative ROS     Musculoskeletal (-) negative ROS    Abdominal  - normal exam    Bowel sounds: normal.   Substance History - negative use     OB/GYN negative ob/gyn ROS         Other        ROS/Med Hx Other: Theresa Sargent is a 62 y.o. female who presented to [facility] on 12/4/2022 complaining of shortness of breath which progressively worsened today.  She states that for the last few days she has had a headache, nonproductive cough, fever, chills, generalized weakness and decreased appetite.                Anesthesia Plan    ASA 4     MAC   total IV anesthesia  intravenous induction     Anesthetic plan, risks, benefits, and alternatives have been provided, discussed and informed consent has been obtained with: patient.        CODE STATUS:    Code Status (Patient has no pulse and is not breathing): CPR (Attempt to Resuscitate)  Medical Interventions (Patient has pulse or is breathing): Full Support

## 2022-12-06 NOTE — ANESTHESIA POSTPROCEDURE EVALUATION
Patient: Theresa Sargent    Procedure Summary     Date: 12/06/22 Room / Location: [facility] ENDOSCOPY 2 / [facility] ENDOSCOPY    Anesthesia Start: 1307 Anesthesia Stop: 1323    Procedure: BRONCHOSCOPY (Bronchus) Diagnosis:       Mucus plug in respiratory tract      (Mucus plug in respiratory tract [T17.998A])    Surgeons: Dakota Bradford MD Provider: Pedro Armijo MD    Anesthesia Type: MAC ASA Status: 4          Anesthesia Type: MAC    Vitals  Vitals Value Taken Time   /39 12/06/22 1334   Temp     Pulse 102 12/06/22 1343   Resp 18 12/06/22 1334   SpO2 94 % 12/06/22 1343   Vitals shown include unvalidated device data.        Post Anesthesia Care and Evaluation    Patient location during evaluation: PACU  Patient participation: complete - patient participated  Level of consciousness: awake  Pain scale: See nurse's notes for pain score.  Pain management: adequate    Airway patency: patent  Anesthetic complications: No anesthetic complications  PONV Status: none  Cardiovascular status: acceptable  Respiratory status: acceptable  Hydration status: acceptable    Comments: Patient seen and examined postoperatively; vital signs stable; SpO2 greater than or equal to 90%; cardiopulmonary status stable; nausea/vomiting adequately controlled; pain adequately controlled; no apparent anesthesia complications; patient discharged from anesthesia care when discharge criteria were met

## 2022-12-06 NOTE — PROGRESS NOTES
"Daily Progress Note        COPD with acute exacerbation (HCC)    Hypertension    Acute on chronic respiratory failure with hypoxia (HCC)    Underweight    Mild depression    Mucus plug in respiratory tract      Assessment    COPD with acute exacerbation (HCC)  Hypoxemia  Acute bronchitis  Essential hypertension  History of depression     Recommendations:    Chest CT without contrast    Patient is unable to clear secretions: Plan bronchoscopy 12/6/2022  -Platelets 291    Oxygen supplement and titration to maintain saturation 90 to 95%  Currently requiring 4 L per NC    IV steroids, Solu-Medrol 40 mg daily  P.o. doxycycline  Bronchodilatores/Inhaled corticosteroids  DVT/GI prophylaxis  correct electrolytes as needed          LOS: 1 day     Subjective         Objective     Vital signs for last 24 hours:  Vitals:    12/05/22 1623 12/05/22 1700 12/05/22 2004 12/06/22 0359   BP: (!) 85/55 98/53 108/53 99/42   BP Location: Left arm  Left arm Left arm   Patient Position: Sitting  Lying Lying   Pulse: 83 74 80 72   Resp: 18  16 16   Temp: 98.1 °F (36.7 °C)  97.8 °F (36.6 °C) 97.9 °F (36.6 °C)   TempSrc: Oral  Oral Oral   SpO2: 92%  96% 95%   Weight:   29 kg (63 lb 14.9 oz)    Height:   157.5 cm (62\")        Intake/Output last 3 shifts:  I/O last 3 completed shifts:  In: 550 [P.O.:550]  Out: 250 [Urine:250]  Intake/Output this shift:  I/O this shift:  In: 240 [P.O.:240]  Out: -       Radiology  Imaging Results (Last 24 Hours)     Procedure Component Value Units Date/Time    XR Chest 1 View [[id number]] Collected: 12/05/22 0737     Updated: 12/05/22 0740    Narrative:         DATE OF EXAM:   12/4/2022 9:56 PM     PROCEDURE:   XR CHEST 1 VW-     INDICATIONS:   shortness of breath and cough     COMPARISON:  12/05/2017     TECHNIQUE:   Portable chest radiograph.     FINDINGS:    The cardiomediastinal silhouette is within normal limits. The lungs are  clear. There is no pneumothorax, focal consolidation, or large pleural  effusion. " Osseous structures grossly intact. Lungs are hyperexpanded  which suggest emphysema.       Impression:      No acute process.      Electronically Signed By-Juan A Whitman MD On:12/5/2022 7:38 AM  This report was finalized on [id number] by  Juan A Whitman MD.          Labs:  Results from last 7 days   Lab Units 12/05/22  0555   WBC 10*3/mm3 9.80   HEMOGLOBIN g/dL 14.2   HEMATOCRIT % 43.9   PLATELETS 10*3/mm3 291     Results from last 7 days   Lab Units 12/05/22  0555 12/04/22  2223   SODIUM mmol/L 134* 135*   POTASSIUM mmol/L 4.5 4.4   CHLORIDE mmol/L 97* 100   CO2 mmol/L 25.0 25.0   BUN mg/dL 6* 8   CREATININE mg/dL 0.64 0.70   CALCIUM mg/dL 8.7 8.4*   BILIRUBIN mg/dL  --  <0.2   ALK PHOS U/L  --  57   ALT (SGPT) U/L  --  16   AST (SGOT) U/L  --  24   GLUCOSE mg/dL 160* 102*     Results from last 7 days   Lab Units 12/04/22  2259   PH, ARTERIAL pH units 7.412   PO2 ART mm Hg 74.2*   PCO2, ARTERIAL mm Hg 42.7   HCO3 ART mmol/L 27.2     Results from last 7 days   Lab Units 12/04/22  2223   ALBUMIN g/dL 3.70     Results from last 7 days   Lab Units 12/04/22  2223   TROPONIN T ng/mL <0.010                           Meds:   SCHEDULE  albuterol sulfate HFA, 2 puff, Inhalation, Once  budesonide-formoterol, 2 puff, Inhalation, BID - RT  citalopram, 40 mg, Oral, Daily  doxycycline, 100 mg, Oral, Q12H  heparin (porcine), 5,000 Units, Subcutaneous, Q12H  methylPREDNISolone sodium succinate, 40 mg, Intravenous, Daily  senna-docusate sodium, 2 tablet, Oral, BID  sodium chloride, 10 mL, Intravenous, Q12H      Infusions     PRNs  •  acetaminophen **OR** acetaminophen **OR** acetaminophen  •  senna-docusate sodium **AND** polyethylene glycol **AND** bisacodyl **AND** bisacodyl  •  influenza vaccine  •  ipratropium-albuterol  •  melatonin  •  nitroglycerin  •  ondansetron **OR** ondansetron  •  sodium chloride  •  sodium chloride  •  sodium chloride    Physical Exam:  Physical Exam  Cardiovascular:      Heart sounds: No murmur  heard.  Pulmonary:      Breath sounds: Wheezing, rhonchi and rales present.         ROS  Review of Systems   Respiratory: Positive for cough and shortness of breath.        I have reviewed the patient's new clinical results.    Electronically signed by Dakota Bradford MD

## 2022-12-06 NOTE — PLAN OF CARE
Problem: Adult Inpatient Plan of Care  Goal: Plan of Care Review  12/5/2022 1953 by Bob Carney, RN  Outcome: Ongoing, Progressing  12/5/2022 1951 by Bob Carney, RN  Outcome: Ongoing, Progressing  Goal: Patient-Specific Goal (Individualized)  Outcome: Ongoing, Progressing  Goal: Absence of Hospital-Acquired Illness or Injury  Outcome: Ongoing, Progressing  Goal: Optimal Comfort and Wellbeing  Outcome: Ongoing, Progressing  Goal: Readiness for Transition of Care  Outcome: Ongoing, Progressing   Goal Outcome Evaluation:

## 2022-12-06 NOTE — THERAPY EVALUATION
Patient Name: Theresa Sargent  : 1960    MRN: [id number]                              Today's Date: 2022       Admit Date: 2022    Visit Dx:     ICD-10-CM ICD-9-CM   1. COPD with acute exacerbation (HCC)  J44.1 491.21   2. Hypoxia  R09.02 799.02   3. Mucus plug in respiratory tract  T17.998A 934.9     Patient Active Problem List   Diagnosis   • COPD with acute exacerbation (HCC)   • Hypertension   • Acute on chronic respiratory failure with hypoxia (HCC)   • Underweight   • Mild depression   • Mucus plug in respiratory tract     Past Medical History:   Diagnosis Date   • COPD (chronic obstructive pulmonary disease) (HCC)      Past Surgical History:   Procedure Laterality Date   •  SECTION     • SPLENECTOMY        General Information     Row Name 22 [facility]          Physical Therapy Time and Intention    Document Type evaluation  -AM     Mode of Treatment physical therapy  -AM     Row Name 22 1307          General Information    Patient Profile Reviewed yes  -AM     Prior Level of Function independent:;community mobility;gait;transfer;bed mobility  -AM     Existing Precautions/Restrictions fall;oxygen therapy device and L/min  4L O2  -AM     Barriers to Rehab none identified  -AM     Row Name 22 1307          Living Environment    People in Home spouse  -AM     Row Name 22 1307          Home Main Entrance    Number of Stairs, Main Entrance none  -AM     Row Name 22 1307          Stairs Within Home, Primary    Number of Stairs, Within Home, Primary none  -AM     Row Name 22 1307          Cognition    Orientation Status (Cognition) oriented x 4  -AM     Row Name 22 1307          Safety Issues, Functional Mobility    Impairments Affecting Function (Mobility) balance;endurance/activity tolerance;strength;shortness of breath  -AM     Comment, Safety Issues/Impairments (Mobility) gait belt utilized  -AM           User Key  (r) = Recorded By, (t) = Taken  "By, (c) = Cosigned By    Initials Name Provider Type    AM Isidro Sinclair, PT Physical Therapist               Mobility     Row Name 12/06/22 1309          Bed Mobility    Bed Mobility bed mobility (all) activities  -AM     All Activities, [place] (Bed Mobility) modified independence  -AM     Row Name 12/06/22 1309          Sit-Stand Transfer    Sit-Stand [place] (Transfers) contact guard;1 person assist  -AM     Assistive Device (Sit-Stand Transfers) walker, front-wheeled  -AM     Comment, (Sit-Stand Transfer) 1st attempt- stood for 45 seconds before needing to sit back down  -AM     Row Name 12/06/22 1309          Gait/Stairs (Locomotion)    [place] Level (Gait) contact guard;minimum assist (75% patient effort);1 person assist  -AM     Assistive Device (Gait) walker, front-wheeled  -AM     Distance in Feet (Gait) 15'  -AM     Comment, (Gait/Stairs) decreased endurace, SOA, decreased balance, flexed trunk, decreased gait speed, c/o \"wobbly legs\"  -AM           User Key  (r) = Recorded By, (t) = Taken By, (c) = Cosigned By    Initials Name Provider Type    AM Isidro Sinclair, PT Physical Therapist               Obj/Interventions     Row Name 12/06/22 1312          Range of Motion Comprehensive    General Range of Motion no range of motion deficits identified  -AM     Row Name 12/06/22 1312          Strength Comprehensive (MMT)    Comment, General Manual Muscle Testing (MMT) Assessment 4/5 throughout  -AM     Row Name 12/06/22 1312          Motor Skills    Motor Skills functional endurance  -AM     Functional Endurance fair -  -AM     Row Name 12/06/22 1312          Balance    Balance Assessment sitting dynamic balance;sit to stand dynamic balance;sitting static balance;standing static balance;standing dynamic balance  -AM     Static Sitting Balance independent  -AM     Dynamic Sitting Balance independent  -AM     Position, Sitting Balance unsupported;sitting edge of bed  -AM     Sit to Stand Dynamic " Balance contact guard  -AM     Static Standing Balance contact guard  -AM     Dynamic Standing Balance contact guard;minimal assist  -AM     Position/Device Used, Standing Balance walker, 4-wheeled  -AM     Row Name 12/06/22 1312          Sensory Assessment (Somatosensory)    Sensory Assessment (Somatosensory) sensation intact  -AM           User Key  (r) = Recorded By, (t) = Taken By, (c) = Cosigned By    Initials Name Provider Type    AM Isidro Sinclair, PT Physical Therapist               Goals/Plan     Row Name 12/06/22 1327          Transfer Goal 1 (PT)    Activity/Assistive Device (Transfer Goal 1, PT) transfers, all  -AM     [place] Level/Cues Needed (Transfer Goal 1, PT) modified independence  -AM     Time Frame (Transfer Goal 1, PT) long term goal (LTG)  -AM     Strategies/Barriers (Transfers Goal 1, PT) with O2 sats staying above 90%  -AM     Row Name 12/06/22 1327          Gait Training Goal 1 (PT)    Activity/Assistive Device (Gait Training Goal 1, PT) gait (walking locomotion);walker, rolling  -AM     [place] Level (Gait Training Goal 1, PT) modified independence  -AM     Distance (Gait Training Goal 1, PT) 100'  -AM     Time Frame (Gait Training Goal 1, PT) long term goal (LTG)  -AM     Strategies/Barriers (Gait Training Goal 1, PT) with O2 sats staying above 90%  -AM     Row Name 12/06/22 1327          Therapy Assessment/Plan (PT)    Planned Therapy Interventions (PT) balance training;bed mobility training;gait training;strengthening;patient/family education;neuromuscular re-education;transfer training  -AM           User Key  (r) = Recorded By, (t) = Taken By, (c) = Cosigned By    Initials Name Provider Type    AM Isidro Sinclair, PT Physical Therapist               Clinical Impression     Row Name 12/06/22 1313          Pain    Pretreatment Pain Rating 0/10 - no pain  -AM     Posttreatment Pain Rating 0/10 - no pain  -AM     Row Name 12/06/22 1313          Plan of Care Review    Plan of Care  "Reviewed With patient;spouse  -AM     Outcome Evaluation Pt is a 63 y/o female with progressively worsening SOB with c/o headache, nonproductive cough, fever, chills, generalized weakness and decreased appetite.  Chest X-ray (-), CT chest: PNA, bronchiectasis.  Dx:  COPD exacerbation.  Pt lives with her  in a 1 story home with no steps to enter into home.  Pt was mod I with all functional mobility tasks and did not utilize an assistive device.  Pt on 4L O2 with telemetry and O2 sat monitor this date.  Pt was mod I with bed mobility, cga for transfers with RW.  1st attempt- pt only able to stand for 45 seconds before \"wobbly legs\" and needing to sit back down at EOB.  O2 sats decreased to 88% with 1st attempt at transfer.  2nd attempt: O2 sats 89%. Pt ambulated 15' with RW with cga/min A.  O2 sats decreased to 87% and pt unable to ambulate any farther distance secondary to weakness in BLEs.  Spoke at length regarding recommendation for SNF.  Pt adamantly opposed to SNF or HHPT.  Spoke at length regarding need for RW, pulse ox to monitor ambulation at home and energy conservation techniques if pt does decide to d/c home without PT services.  Will continue to work with pt while in hospital setting.  -AM     Row Name 12/06/22 1313          Therapy Assessment/Plan (PT)    Patient/Family Therapy Goals Statement (PT) To go home  -AM     Rehab Potential (PT) good, to achieve stated therapy goals  -AM     Criteria for Skilled Interventions Met (PT) yes;skilled treatment is necessary  -AM     Therapy Frequency (PT) 5 times/wk  -AM     Predicted Duration of Therapy Intervention (PT) until d/c  -AM     Row Name 12/06/22 1313          Vital Signs    Pre SpO2 (%) 94  -AM     O2 Delivery Pre Treatment nasal cannula  4L O2  -AM     Intra SpO2 (%) 87  -AM     O2 Delivery Intra Treatment nasal cannula  -AM     Post SpO2 (%) 92  -AM     O2 Delivery Post Treatment nasal cannula  -AM     Pre Patient Position Supine  -AM     Intra " Patient Position Sitting  -AM     Post Patient Position Supine  -AM     Row Name 12/06/22 1313          Positioning and Restraints    Pre-Treatment Position in bed  -AM     Post Treatment Position bed  -AM     In Bed notified nsg;supine;call light within reach;encouraged to call for assist  -AM           User Key  (r) = Recorded By, (t) = Taken By, (c) = Cosigned By    Initials Name Provider Type    AM Isidro Sinclair, PT Physical Therapist               Outcome Measures     Row Name 12/06/22 1328          How much help from another person do you currently need...    Turning from your back to your side while in flat bed without using bedrails? 4  -AM     Moving from lying on back to sitting on the side of a flat bed without bedrails? 4  -AM     Moving to and from a bed to a chair (including a wheelchair)? 3  -AM     Standing up from a chair using your arms (e.g., wheelchair, bedside chair)? 3  -AM     Climbing 3-5 steps with a railing? 2  -AM     To walk in hospital room? 3  -AM     AM-PAC 6 Clicks Score (PT) 19  -AM     Highest level of mobility 6 --> Walked 10 steps or more  -AM     Row Name 12/06/22 1328          Functional Assessment    Outcome Measure Options AM-PAC 6 Clicks Basic Mobility (PT)  -AM           User Key  (r) = Recorded By, (t) = Taken By, (c) = Cosigned By    Initials Name Provider Type    AM Isidro Sinclair, PT Physical Therapist                             Physical Therapy Education     Title: PT OT SLP Therapies (Done)     Topic: Physical Therapy (Done)     Point: Mobility training (Done)     Learning Progress Summary           Patient Acceptance, E,TB, VU by AM at 12/6/2022 1328   Significant Other Acceptance, E,TB, VU by AM at 12/6/2022 1328                   Point: Home exercise program (Done)     Learning Progress Summary           Patient Acceptance, E,TB, VU by AM at 12/6/2022 1328   Significant Other Acceptance, E,TB, VU by AM at 12/6/2022 1328                   Point: Body mechanics  "(Done)     Learning Progress Summary           Patient Acceptance, E,TB, VU by AM at 12/6/2022 1328   Significant Other Acceptance, E,TB, VU by AM at 12/6/2022 1328                   Point: Precautions (Done)     Learning Progress Summary           Patient Acceptance, E,TB, VU by AM at 12/6/2022 1328   Significant Other Acceptance, E,TB, VU by AM at 12/6/2022 1328                               User Key     Initials Effective Dates Name Provider Type Discipline    AM 05/10/21 -  Isidro Sinclair PT Physical Therapist PT              PT Recommendation and Plan  Planned Therapy Interventions (PT): balance training, bed mobility training, gait training, strengthening, patient/family education, neuromuscular re-education, transfer training  Plan of Care Reviewed With: patient, spouse  Outcome Evaluation: Pt is a 61 y/o female with progressively worsening SOB with c/o headache, nonproductive cough, fever, chills, generalized weakness and decreased appetite.  Chest X-ray (-), CT chest: PNA, bronchiectasis.  Dx:  COPD exacerbation.  Pt lives with her  in a 1 story home with no steps to enter into home.  Pt was mod I with all functional mobility tasks and did not utilize an assistive device.  Pt on 4L O2 with telemetry and O2 sat monitor this date.  Pt was mod I with bed mobility, cga for transfers with RW.  1st attempt- pt only able to stand for 45 seconds before \"wobbly legs\" and needing to sit back down at EOB.  O2 sats decreased to 88% with 1st attempt at transfer.  2nd attempt: O2 sats 89%. Pt ambulated 15' with RW with cga/min A.  O2 sats decreased to 87% and pt unable to ambulate any farther distance secondary to weakness in BLEs.  Spoke at length regarding recommendation for SNF.  Pt adamantly opposed to SNF or HHPT.  Spoke at length regarding need for RW, pulse ox to monitor ambulation at home and energy conservation techniques if pt does decide to d/c home without PT services.  Will continue to work with pt " while in hospital setting.     Time Calculation:    PT Charges     Row Name 12/06/22 1329             Time Calculation    Start Time 1132  -AM      Stop Time 1153  -AM      Time Calculation (min) 21 min  -AM      PT Received On 12/06/22  -AM      PT - Next Appointment 12/07/22  -AM      PT Goal Re-Cert Due Date 12/20/22  -AM            User Key  (r) = Recorded By, (t) = Taken By, (c) = Cosigned By    Initials Name Provider Type    AM Isidro Sinclair, PT Physical Therapist              Therapy Charges for Today     Code Description Service Date Service Provider Modifiers Qty    [id number] HC PT EVAL MOD COMPLEXITY 3 12/6/2022 Isidro Sinclair, PT GP 1          PT G-Codes  Outcome Measure Options: AM-PAC 6 Clicks Basic Mobility (PT)  AM-PAC 6 Clicks Score (PT): 19  PT Discharge Summary  Anticipated Discharge Disposition (PT): home with assist, other (see comments) (strongly recommend SNF vs HHPT.  Pt refuses both services)    Isidro Sinclair, PT  12/6/2022

## 2022-12-06 NOTE — PLAN OF CARE
Problem: Adult Inpatient Plan of Care  Goal: Plan of Care Review  12/6/2022 0238 by Bob Carney, RN  Outcome: Ongoing, Progressing  12/5/2022 1953 by Bob Carney RN  Outcome: Ongoing, Progressing  12/5/2022 1951 by Bob Carney, RN  Outcome: Ongoing, Progressing  Goal: Patient-Specific Goal (Individualized)  Outcome: Ongoing, Progressing  Goal: Absence of Hospital-Acquired Illness or Injury  Outcome: Ongoing, Progressing  Goal: Optimal Comfort and Wellbeing  Outcome: Ongoing, Progressing  Goal: Readiness for Transition of Care  Outcome: Ongoing, Progressing  Intervention: Mutually Develop Transition Plan  Recent Flowsheet Documentation  Taken 12/5/2022 2008 by Bob Carney, RN  Transportation Anticipated: family or friend will provide  Transportation Concerns: none  Patient/Family Anticipated Services at Transition: none  Patient/Family Anticipates Transition to: home  Taken 12/5/2022 1957 by Bob Carney, RN  Equipment Currently Used at Home: none   Goal Outcome Evaluation:

## 2022-12-06 NOTE — OP NOTE
Bronchoscopy Procedure Note    Timeout was done appropriately by staff    Procedure:  1. Bronchoscopy, Diagnostic  2. Bronchoscopy, Therapeutic, Removal of mucus plugs   3. Bronchial washing    Preoperative Diagnosis:   Difficulty clearing secretions with COPD exacerbation, patient requested bronchoscopy    Postoperative Diagnosis:    mucoid secretions suctioned therapeutically   Bilateral lung washing  Moderate inflammatory airway disease  Severe reactive airway disease    Anesthesia: Moderate Sedation    Procedure Details: Patient was consented for the procedure with all risks and benefits of the procedure explained in detail.  Patient was given the opportunity to ask questions and all concerns were answered.  The bronchocope was inserted into the main airway via the oropharynx. An anatomical survey was done of the main airways and the subsegmental bronchus to at least the first subsegmental level of all five lobes of both lungs.  The findings are reported below.  A bronchoalveolar lavage was performed using aliquots of normal saline instilled into the airways then aspirated back.    Findings:  Bronchoscope passed into oral cavity to the level of the vocal cords.  Lidocaine used for local anesthetic over vocal cords.  Bronchoscope was passed between the vocal cords into the trachea.  All airways were visualized to at least the first subsegment level of all 5 lobes of both lungs.  Airways were of normal size and caliber.  No endobronchial lesions seen.     mucoid secretions suctioned therapeutically   Bilateral lung washing  Moderate inflammatory airway disease  Severe reactive airway disease  Patient tolerated procedure well        Estimated Blood Loss:  Minimal           Specimens:  Sent purulent fluid                Complications:  None; patient tolerated the procedure well.           Disposition: PACU - hemodynamically stable.      Patient tolerated the procedure well.    Dakota Bradford MD  12/6/2022  18:11  EST

## 2022-12-06 NOTE — PROGRESS NOTES
[facility] Daily Progress Note    Patient Name: Theresa Sargent  : 1960  MRN: [id number]  Primary Care Physician:  Marito Velasquez MD  Date of admission: 2022      Subjective      Brief interim history: 62-year-old frail-appearing female with known history of COPD, uses oxygen intermittently at times, remote history of tobacco use, hypertension, and depression.  Patient was admitted on 2022, presented to [facility] ED complaining of worsening shortness of breath with chest congestion for the past few days.  She endorses nonproductive cough with associated fever, chills, frontal headache, decreased appetite and generalized body ache.  Patient denies any travel or sick contact.  On presentation, she was hypoxic with O2 saturation in the mid 80's, but oxygenation improved on minimal FiO2 at 4 L via NC.  Chest x-ray showed no acute cardiopulmonary process, and respiratory panel is unremarkable.  She was treated with Solu-Medrol, bronchodilators, and empirical antibiotic with doxycycline.  She was seen in consultation by pulmonologist, and plan for bronchoscopy in a.m.     2022: Seen and examined in follow-up.  Feeling slightly better and resting comfortably in bed in no acute respiratory distress.    2022: Seen and examined in follow-up.  Underwent bronchoscopy (), and repeated respiratory panel now + for Inf A.        Objective      Vitals:   Temp:  [97.8 °F (36.6 °C)-98.1 °F (36.7 °C)] 98 °F (36.7 °C)  Heart Rate:  [] 80  Resp:  [14-21] 19  BP: ()/(39-63) 88/48  Flow (L/min):  [4-15] 4    Physical Exam  Constitutional:       General: She is not in acute distress.     Appearance: She is not ill-appearing.      Comments: Frail-appearing female   HENT:      Head: Normocephalic.      Right Ear: Tympanic membrane normal.      Nose: Nose normal.      Mouth/Throat:      Mouth: Mucous membranes are moist.   Cardiovascular:      Rate and  Rhythm: Normal rate.      Pulses: Normal pulses.   Pulmonary:      Effort: Pulmonary effort is normal.   Abdominal:      Palpations: Abdomen is soft.   Genitourinary:     Rectum: Normal.   Musculoskeletal:         General: No swelling or tenderness. Normal range of motion.      Cervical back: Neck supple.   Skin:     General: Skin is warm.   Neurological:      General: No focal deficit present.      Mental Status: She is alert.             Result Review    Result Review:  I have personally reviewed the results from the time of this admission to 12/6/2022 16:09 EST and agree with these findings:  []  Laboratory  []  Microbiology  []  Radiology  []  EKG/Telemetry   []  Cardiology/Vascular   []  Pathology  []  Old records  []  Other:  Most notable findings include:           Assessment & Plan        albuterol sulfate HFA, 2 puff, Inhalation, Once  budesonide-formoterol, 2 puff, Inhalation, BID - RT  citalopram, 40 mg, Oral, Daily  doxycycline, 100 mg, Oral, Q12H  heparin (porcine), 5,000 Units, Subcutaneous, Q12H  methylPREDNISolone sodium succinate, 40 mg, Intravenous, Daily  senna-docusate sodium, 2 tablet, Oral, BID  sodium chloride, 10 mL, Intravenous, Q12H             Active Hospital Problems:  Active Hospital Problems    Diagnosis    • **COPD with acute exacerbation (HCC)    • Hypertension    • Acute on chronic respiratory failure with hypoxia (HCC)    • Underweight    • Mild depression    • Mucus plug in respiratory tract      Assessment/plan:    Acute hypoxic respiratory failure       -2/2 below    Acute on chronic COPD exacerbation/bronchitis       -S/p bronchoscopy (12/6) by Dr. Burt       -solu-Medrol/bronchodilators/Doxy    Influenza A infection      -started tamiflu(12/7)            Hypertension       -Toprol-XL on hold due to soft BP    Depression       -Celexa     Generalized weakness/debility       -PT/OT      DVT prophylaxis:  Medical DVT prophylaxis orders are present.    CODE STATUS:    Code Status  (Patient has no pulse and is not breathing): CPR (Attempt to Resuscitate)  Medical Interventions (Patient has pulse or is breathing): Full Support      Disposition:  I expect patient to be discharged.      Electronically signed by Anjel Alford MD, 12/06/22, 16:09 EST.  [facility] Hospitalist Team

## 2022-12-06 NOTE — PLAN OF CARE
Goal Outcome Evaluation:              Outcome Evaluation: Pt had bronchoscopy done today, continuing to monitor.

## 2022-12-06 NOTE — CASE MANAGEMENT/SOCIAL WORK
Continued Stay Note   [facility]     Patient Name: Theresa Sargent  MRN: [id number]  Today's Date: 12/6/2022    Admit Date: 12/4/2022    Plan: D/C plan: Anticipate home. Watch for oxygen needs, PT eval pending   Discharge Plan     Row Name 12/06/22 1057       Plan    Plan D/C plan: Anticipate home. Watch for oxygen needs, PT eval pending    Patient/Family in Agreement with Plan yes    Plan Comments Anticipate home with family when medically ready. Watch for oxygen needs. No home O2 used. PT eval pending. Barrier to d/c: 4L O2, IV Solumedrol, ?Bronch today                   Expected Discharge Date and Time     Expected Discharge Date Expected Discharge Time    Dec 7, 2022         Phone communication or documentation only - no physical contact with patient or family.      Chuy Enriquez RN

## 2022-12-06 NOTE — PLAN OF CARE
Goal Outcome Evaluation:  Plan of Care Reviewed With: patient        Progress: improving   Quiet restful noc. No c/o's pain since admission. Pt unsteady on feet. Reminded patient to call for assistance to get up, voiced understanding.  remains at bedside.

## 2022-12-06 NOTE — PLAN OF CARE
"Goal Outcome Evaluation:  Plan of Care Reviewed With: patient, spouse           Outcome Evaluation: Pt is a 61 y/o female with progressively worsening SOB with c/o headache, nonproductive cough, fever, chills, generalized weakness and decreased appetite.  Chest X-ray (-), CT chest: PNA, bronchiectasis.  Dx:  COPD exacerbation.  Pt lives with her  in a 1 story home with no steps to enter into home.  Pt was mod I with all functional mobility tasks and did not utilize an assistive device.  Pt on 4L O2 with telemetry and O2 sat monitor this date.  Pt was mod I with bed mobility, cga for transfers with RW.  1st attempt- pt only able to stand for 45 seconds before \"wobbly legs\" and needing to sit back down at EOB.  O2 sats decreased to 88% with 1st attempt at transfer.  2nd attempt: O2 sats 89%. Pt ambulated 15' with RW with cga/min A.  O2 sats decreased to 87% and pt unable to ambulate any farther distance secondary to weakness in BLEs.  Spoke at length regarding recommendation for SNF.  Pt adamantly opposed to SNF or HHPT.  Spoke at length regarding need for RW, pulse ox to monitor ambulation at home and energy conservation techniques if pt does decide to d/c home without PT services.  Will continue to work with pt while in hospital setting.  "

## 2022-12-07 ENCOUNTER — READMISSION MANAGEMENT (OUTPATIENT)
Dept: CALL CENTER | Facility: HOSPITAL | Age: 62
End: 2022-12-07

## 2022-12-07 VITALS
OXYGEN SATURATION: 94 % | DIASTOLIC BLOOD PRESSURE: 70 MMHG | RESPIRATION RATE: 20 BRPM | HEIGHT: 62 IN | WEIGHT: 83.2 LBS | SYSTOLIC BLOOD PRESSURE: 103 MMHG | TEMPERATURE: 98 F | BODY MASS INDEX: 15.31 KG/M2 | HEART RATE: 82 BPM

## 2022-12-07 LAB
ANION GAP SERPL CALCULATED.3IONS-SCNC: 12 MMOL/L (ref 5–15)
BASOPHILS # BLD AUTO: 0.1 10*3/MM3 (ref 0–0.2)
BASOPHILS NFR BLD AUTO: 1.1 % (ref 0–1.5)
BUN SERPL-MCNC: 10 MG/DL (ref 8–23)
BUN/CREAT SERPL: 17.2 (ref 7–25)
CALCIUM SPEC-SCNC: 8.9 MG/DL (ref 8.6–10.5)
CHLORIDE SERPL-SCNC: 100 MMOL/L (ref 98–107)
CO2 SERPL-SCNC: 27 MMOL/L (ref 22–29)
CREAT SERPL-MCNC: 0.58 MG/DL (ref 0.57–1)
D DIMER PPP FEU-MCNC: 0.22 MG/L (FEU) (ref 0–0.63)
DEPRECATED RDW RBC AUTO: 46.4 FL (ref 37–54)
EGFRCR SERPLBLD CKD-EPI 2021: 101.8 ML/MIN/1.73
EOSINOPHIL # BLD AUTO: 0 10*3/MM3 (ref 0–0.4)
EOSINOPHIL NFR BLD AUTO: 0 % (ref 0.3–6.2)
ERYTHROCYTE [DISTWIDTH] IN BLOOD BY AUTOMATED COUNT: 14 % (ref 12.3–15.4)
GLUCOSE SERPL-MCNC: 97 MG/DL (ref 65–99)
HCT VFR BLD AUTO: 40.4 % (ref 34–46.6)
HGB BLD-MCNC: 13 G/DL (ref 12–15.9)
LYMPHOCYTES # BLD AUTO: 1.4 10*3/MM3 (ref 0.7–3.1)
LYMPHOCYTES NFR BLD AUTO: 10.4 % (ref 19.6–45.3)
MCH RBC QN AUTO: 30.7 PG (ref 26.6–33)
MCHC RBC AUTO-ENTMCNC: 32.2 G/DL (ref 31.5–35.7)
MCV RBC AUTO: 95.3 FL (ref 79–97)
MONOCYTES # BLD AUTO: 0.7 10*3/MM3 (ref 0.1–0.9)
MONOCYTES NFR BLD AUTO: 5.2 % (ref 5–12)
NEUTROPHILS NFR BLD AUTO: 11.1 10*3/MM3 (ref 1.7–7)
NEUTROPHILS NFR BLD AUTO: 83.3 % (ref 42.7–76)
NRBC BLD AUTO-RTO: 0 /100 WBC (ref 0–0.2)
PLATELET # BLD AUTO: 376 10*3/MM3 (ref 140–450)
PMV BLD AUTO: 8.7 FL (ref 6–12)
POTASSIUM SERPL-SCNC: 5 MMOL/L (ref 3.5–5.2)
RBC # BLD AUTO: 4.24 10*6/MM3 (ref 3.77–5.28)
RBC MORPH BLD: NORMAL
SMALL PLATELETS BLD QL SMEAR: ADEQUATE
SODIUM SERPL-SCNC: 139 MMOL/L (ref 136–145)
WBC MORPH BLD: NORMAL
WBC NRBC COR # BLD: 13.3 10*3/MM3 (ref 3.4–10.8)

## 2022-12-07 PROCEDURE — 94799 UNLISTED PULMONARY SVC/PX: CPT

## 2022-12-07 PROCEDURE — 25010000002 HEPARIN (PORCINE) PER 1000 UNITS

## 2022-12-07 PROCEDURE — 85007 BL SMEAR W/DIFF WBC COUNT: CPT

## 2022-12-07 PROCEDURE — 36415 COLL VENOUS BLD VENIPUNCTURE: CPT

## 2022-12-07 PROCEDURE — 85025 COMPLETE CBC W/AUTO DIFF WBC: CPT

## 2022-12-07 PROCEDURE — 94618 PULMONARY STRESS TESTING: CPT

## 2022-12-07 PROCEDURE — 80048 BASIC METABOLIC PNL TOTAL CA: CPT

## 2022-12-07 PROCEDURE — 25010000002 METHYLPREDNISOLONE PER 40 MG

## 2022-12-07 PROCEDURE — 85379 FIBRIN DEGRADATION QUANT: CPT | Performed by: INTERNAL MEDICINE

## 2022-12-07 RX ORDER — OSELTAMIVIR PHOSPHATE 6 MG/ML
30 FOR SUSPENSION ORAL EVERY 12 HOURS SCHEDULED
Status: DISCONTINUED | OUTPATIENT
Start: 2022-12-07 | End: 2022-12-07 | Stop reason: HOSPADM

## 2022-12-07 RX ORDER — DOXYCYCLINE 100 MG/1
100 TABLET ORAL EVERY 12 HOURS SCHEDULED
Qty: 6 TABLET | Refills: 0 | Status: SHIPPED | OUTPATIENT
Start: 2022-12-07 | End: 2022-12-10

## 2022-12-07 RX ORDER — PREDNISONE 20 MG/1
20 TABLET ORAL DAILY
Status: DISCONTINUED | OUTPATIENT
Start: 2022-12-10 | End: 2022-12-07 | Stop reason: HOSPADM

## 2022-12-07 RX ORDER — PREDNISONE 10 MG/1
10 TABLET ORAL DAILY
Status: DISCONTINUED | OUTPATIENT
Start: 2022-12-12 | End: 2022-12-07 | Stop reason: HOSPADM

## 2022-12-07 RX ORDER — PREDNISONE 10 MG/1
10 TABLET ORAL DAILY
Qty: 2 TABLET | Refills: 0 | Status: SHIPPED | OUTPATIENT
Start: 2022-12-12 | End: 2022-12-14

## 2022-12-07 RX ORDER — OSELTAMIVIR PHOSPHATE 75 MG/1
75 CAPSULE ORAL ONCE
Status: COMPLETED | OUTPATIENT
Start: 2022-12-07 | End: 2022-12-07

## 2022-12-07 RX ORDER — PREDNISONE 20 MG/1
20 TABLET ORAL DAILY
Qty: 2 TABLET | Refills: 0 | Status: SHIPPED | OUTPATIENT
Start: 2022-12-10 | End: 2022-12-12

## 2022-12-07 RX ORDER — ALBUTEROL SULFATE 90 UG/1
2 AEROSOL, METERED RESPIRATORY (INHALATION) EVERY 4 HOURS PRN
Qty: 1 G | Refills: 0 | Status: SHIPPED | OUTPATIENT
Start: 2022-12-07

## 2022-12-07 RX ORDER — PREDNISONE 10 MG/1
30 TABLET ORAL DAILY
Qty: 6 TABLET | Refills: 0 | Status: SHIPPED | OUTPATIENT
Start: 2022-12-08 | End: 2022-12-10

## 2022-12-07 RX ADMIN — CITALOPRAM HYDROBROMIDE 40 MG: 20 TABLET ORAL at 07:51

## 2022-12-07 RX ADMIN — METHYLPREDNISOLONE SODIUM SUCCINATE 40 MG: 40 INJECTION, POWDER, FOR SOLUTION INTRAMUSCULAR; INTRAVENOUS at 07:51

## 2022-12-07 RX ADMIN — DOXYCYCLINE 100 MG: 100 TABLET ORAL at 17:35

## 2022-12-07 RX ADMIN — OSELTAMIVIR PHOSPHATE 75 MG: 75 CAPSULE ORAL at 10:14

## 2022-12-07 RX ADMIN — HEPARIN SODIUM 5000 UNITS: 5000 INJECTION INTRAVENOUS; SUBCUTANEOUS at 06:03

## 2022-12-07 RX ADMIN — Medication 10 ML: at 02:48

## 2022-12-07 RX ADMIN — DOXYCYCLINE 100 MG: 100 TABLET ORAL at 06:03

## 2022-12-07 RX ADMIN — BUDESONIDE AND FORMOTEROL FUMARATE DIHYDRATE 2 PUFF: 160; 4.5 AEROSOL RESPIRATORY (INHALATION) at 19:12

## 2022-12-07 RX ADMIN — HEPARIN SODIUM 5000 UNITS: 5000 INJECTION INTRAVENOUS; SUBCUTANEOUS at 17:35

## 2022-12-07 RX ADMIN — Medication 10 ML: at 07:52

## 2022-12-07 NOTE — PROGRESS NOTES
Daily Progress Note        COPD with acute exacerbation (HCC)    Hypertension    Acute on chronic respiratory failure with hypoxia (HCC)    Underweight    Mild depression    Mucus plug in respiratory tract      Assessment    COPD with acute exacerbation (HCC)  Hypoxemia  Acute bronchitis    12/6/2022 bronchoscopy completed, mucoid secretions suctioned therapeutically with moderate inflammatory airway disease and severe reactive airway disease noted  -Influenza positive on respiratory panel    Essential hypertension  History of depression     Recommendations:    Patient will need 6 minute walk prior to discharge     Bronchoscopy completed 12/6/2022  Follow BAL, influenza positive on respiratory failure    Oxygen supplement and titration to maintain saturation 90 to 95%  Currently requiring 4 L per NC    D/C iv steroid and start prednisone 6 day taper  P.o. doxycycline  Bronchodilatores/Inhaled corticosteroids  DVT/GI prophylaxis  correct electrolytes as needed          LOS: 2 days     Subjective         Objective     Vital signs for last 24 hours:  Vitals:    12/06/22 1954 12/06/22 1956 12/06/22 2006 12/07/22 0300   BP:   115/56 117/54   BP Location:   Left arm Left arm   Patient Position:   Lying Lying   Pulse: 72 78 76 59   Resp: 18 18 18 17   Temp:   97.9 °F (36.6 °C) 98.1 °F (36.7 °C)   TempSrc:   Oral Oral   SpO2: 98% 98% 97% 98%   Weight:       Height:           Intake/Output last 3 shifts:  I/O last 3 completed shifts:  In: 1310 [P.O.:1110; I.V.:200]  Out: 1150 [Urine:1150]  Intake/Output this shift:  No intake/output data recorded.      Radiology  Imaging Results (Last 24 Hours)     Procedure Component Value Units Date/Time    CT Chest Without Contrast Diagnostic [[id number]] Collected: 12/06/22 1049     Updated: 12/06/22 1053    Narrative:      EXAM:CT CHEST WO CONTRAST DIAGNOSTIC-     DATE OF EXAM: 12/6/2022 9:28 AM     INDICATION: Pneumonia, complication suspected, xray done; J44.1-Chronic  obstructive  pulmonary disease with (acute) exacerbation;  R09.02-Hypoxemia; T17.998A-Other foreign object in respiratory tract,  part unspecified causing other injury, initial encounter.     COMPARISON: Chest radiograph dated 12/04/2022, prior chest CT dated  06/18/2014     TECHNIQUE: Serial and axial CT images of the chest were obtained.  Reconstructions in the coronal and sagittal planes were performed.   Automated exposure control and iterative reconstruction methods were  used.     FINDINGS:  The visualized soft tissue structures at the base of the neck including  the thyroid appear within normal limits. There is no lower cervical or  axillary adenopathy.     The heart size is normal. There is no pericardial effusion. The aorta is  normal in caliber without evidence of aneurysm formation. The main  pulmonary artery is normal in caliber. There is no mediastinal or hilar  lymphadenopathy.     The tracheobronchial tree demonstrates minimal dependent secretions  within the trachea and mainstem bronchi. There is mild basilar  predominant bronchiectasis. There are scattered areas of peripheral tree  in bud nodularity within the right upper lobe, right lower lobe, and  left lower lobe with more focal consolidation within the medial basal  aspect of the left lower lobe. Findings likely reflect multifocal  infectious or inflammatory bronchiolitis and mild left lower lobe  pneumonia. There is no pleural effusion. There are background findings  of emphysema.     Esophagus is normal in course and caliber. There are small low-density  lesions within the left hepatic lobe liver likely representing small  cysts. There are no acute findings within the upper abdomen. There are  multilevel degenerative changes of the thoracic spine.       Impression:      1. Multifocal tree in bud nodularity likely representing infectious or  inflammatory bronchiolitis. Mild focal pneumonia within the left lower  lobe.  2. Mild basilar predominant  bronchiectasis.           Electronically Signed By-Joey Suarez MD On:12/6/2022 10:51 AM  This report was finalized on [id number] by  Joey Suarez MD.          Labs:  Results from last 7 days   Lab Units 12/06/22  0519   WBC 10*3/mm3 13.80*   HEMOGLOBIN g/dL 13.0   HEMATOCRIT % 38.7   PLATELETS 10*3/mm3 257     Results from last 7 days   Lab Units 12/06/22  0519 12/05/22  0555 12/04/22  2223   SODIUM mmol/L 136   < > 135*   POTASSIUM mmol/L 4.7   < > 4.4   CHLORIDE mmol/L 100   < > 100   CO2 mmol/L 26.0   < > 25.0   BUN mg/dL 12   < > 8   CREATININE mg/dL 0.66   < > 0.70   CALCIUM mg/dL 8.7   < > 8.4*   BILIRUBIN mg/dL  --   --  <0.2   ALK PHOS U/L  --   --  57   ALT (SGPT) U/L  --   --  16   AST (SGOT) U/L  --   --  24   GLUCOSE mg/dL 90   < > 102*    < > = values in this interval not displayed.     Results from last 7 days   Lab Units 12/04/22  2259   PH, ARTERIAL pH units 7.412   PO2 ART mm Hg 74.2*   PCO2, ARTERIAL mm Hg 42.7   HCO3 ART mmol/L 27.2     Results from last 7 days   Lab Units 12/04/22  2223   ALBUMIN g/dL 3.70     Results from last 7 days   Lab Units 12/04/22  2223   TROPONIN T ng/mL <0.010                           Meds:   SCHEDULE  albuterol sulfate HFA, 2 puff, Inhalation, Once  budesonide-formoterol, 2 puff, Inhalation, BID - RT  citalopram, 40 mg, Oral, Daily  doxycycline, 100 mg, Oral, Q12H  heparin (porcine), 5,000 Units, Subcutaneous, Q12H  methylPREDNISolone sodium succinate, 40 mg, Intravenous, Daily  senna-docusate sodium, 2 tablet, Oral, BID  sodium chloride, 10 mL, Intravenous, Q12H      Infusions     PRNs  •  acetaminophen **OR** acetaminophen **OR** acetaminophen  •  senna-docusate sodium **AND** polyethylene glycol **AND** bisacodyl **AND** bisacodyl  •  influenza vaccine  •  ipratropium-albuterol  •  melatonin  •  nitroglycerin  •  ondansetron **OR** ondansetron  •  sodium chloride  •  sodium chloride  •  sodium chloride    Physical Exam:  Physical Exam  Cardiovascular:       Heart sounds: No murmur heard.  Pulmonary:      Breath sounds: Wheezing, rhonchi and rales present.         ROS  Review of Systems   Respiratory: Positive for cough and shortness of breath.        I have reviewed the patient's new clinical results.    Electronically signed by Dakota Bradford MD

## 2022-12-07 NOTE — CASE MANAGEMENT/SOCIAL WORK
Continued Stay Note  THA Fleming     Patient Name: Theresa Sargent  MRN: [id number]  Today's Date: 12/7/2022    Admit Date: 12/4/2022    Plan: D/C plan: Anticipate home. New oxygen needs.   Discharge Plan     Row Name 12/07/22 1134       Plan    Plan D/C plan: Anticipate home. New oxygen needs.    Patient/Family in Agreement with Plan yes    Plan Comments CM received update that pt had 6 min walk and now requires 4L oxygen. CM requested order from MD. CM notified [place] of new oxygen need to be delivered to pt bedside prior to d/c.                   Expected Discharge Date and Time     Expected Discharge Date Expected Discharge Time    Dec 7, 2022         Phone communication or documentation only - no physical contact with patient or family.      Chuy Enriquez RN

## 2022-12-07 NOTE — DISCHARGE PLACEMENT REQUEST
"Theresa Sargent (62 y.o. Female)     Date of Birth   1960    Social Security Number       Address   [address] [place], [place] [address] [facility]    Home Phone   [phone]    MRN   [id number]       Jain   None    Marital Status                               Admission Date   12/4/22    Admission Type   Emergency    Admitting Provider   Anjel Alford MD    Attending Provider   Anjel Alford MD    Department, Room/Bed   [facility] 2A PEDIATRICS, 210/1       Discharge Date       Discharge Disposition       Discharge Destination                               Attending Provider: Anjel Alford MD    Allergies: No Known Allergies    Isolation: Droplet   Infection: Influenza (12/06/22)   Code Status: CPR    Ht: 157.5 cm (62\")   Wt: 37.7 kg (83 lb 3.2 oz)    Admission Cmt: None   Principal Problem: COPD with acute exacerbation (HCC) [J44.1]                 Active Insurance as of 12/4/2022     Primary Coverage     Payor Plan Insurance Group Employer/Plan Group    HUMANA MEDICARE REPLACEMENT HUMANA MEDICARE REPLACEMENT [id number]     Payor Plan Address Payor Plan Phone Number Payor Plan Fax Number Effective Dates    [address] [address] [phone]  1/1/2020 - None Entered    [facility] [id number]       Subscriber Name Subscriber Birth Date Member ID       THERESA SARGENT 1960 [id number]                 Emergency Contacts      (Rel.) Home Phone Work Phone Mobile Phone    RODY SARGENT (Spouse) -- -- [phone]              "

## 2022-12-07 NOTE — PROGRESS NOTES
Exercise Oximetry    Patient Name:Theresa Sargent   MRN: [id number]   Date: 12/07/22             ROOM AIR BASELINE   SpO2% 85   Heart Rate 80   Blood Pressure      EXERCISE ON ROOM AIR SpO2% EXERCISE ON O2 @ 4 LPM SpO2%   1 MINUTE  1 MINUTE 86 @2LPM   2 MINUTES  2 MINUTES 88 @3LPM   3 MINUTES  3 MINUTES 88 @3LPM   4 MINUTES  4 MINUTES 89@4LPM   5 MINUTES  5 MINUTES 90 @4LPM   6 MINUTES  6 MINUTES 91 @4LPM              Distance Walked   Distance Walked   Dyspnea (Emir Scale)   Dyspnea (Emir Scale)   Fatigue (Emir Scale)   Fatigue (Emir Scale)   SpO2% Post Exercise  94 SpO2% Post Exercise   HR Post Exercise  82 HR Post Exercise   Time to Recovery   Time to Recovery     Comments: Patient was on RA at rest and oxygen saturation was 87%. RT had patient sit up on the edge of bed and saturation dropped to 85%. RT placed patient on 2LPM and slowly increased to 4LPM to get saturation within normal value. Patient needed 4LPM to maintain saturation with minimal activity during walking oximetry.

## 2022-12-07 NOTE — DISCHARGE SUMMARY
[facility] Medicine Services  DISCHARGE SUMMARY    Patient Name: Theresa Sargent  : 1960  MRN: [id number]    Date of Admission: 2022  Date of discharge: 2020  Discharge Diagnosis:  1.  Acute hypoxic respiratory failure  2.  Acute on chronic COPD exacerbation/bronchitis  3.  Influenza A infection  4.  Hypertension  5.  Depression  6.  Generalized weakness    Primary Care Physician: Marito Velasquez MD      Presenting Problem:   Hypoxia [R09.02]  COPD with acute exacerbation (HCC) [J44.1]  Mucus plug in respiratory tract [T17.998A]    Active and Resolved Hospital Problems:  Active Hospital Problems    Diagnosis POA   • **COPD with acute exacerbation (HCC) [J44.1] Yes   • Hypertension [I10] Yes   • Acute on chronic respiratory failure with hypoxia (HCC) [J96.21] Yes   • Underweight [R63.6] Yes   • Mild depression [F32.A] Yes   • Mucus plug in respiratory tract [T17.998A] Unknown      Resolved Hospital Problems    Diagnosis POA   • Acute-on-chronic respiratory failure (HCC) [J96.20] Yes         Hospital Course     Brief HPI/Hospital course: 62-year-old frail-appearing female with known history of COPD, uses oxygen intermittently at times, remote history of tobacco use, hypertension, and depression.  Patient was admitted on 2022, presenting to [facility] ED complaining of worsening shortness of breath with chest congestion for the past few days.  She endorses nonproductive cough with associated fever, chills, frontal headache, decreased appetite and generalized body ache.  Patient denies any travel or sick contact.  On presentation, she was hypoxic with O2 saturation in the mid 80's, but oxygenation improved on minimal FiO2 at 4 L via NC.  Chest x-ray showed no acute cardiopulmonary process, and initial respiratory panel was unrevealing.  Repeated respiratory panel on () positive for influenza A.      Patient was admitted and treated with acute on chronic  hypoxic respiratory failure, likely multifactorial and in the setting of influenza A infection.  She was seen in consultation by pulmonologist, and underwent diagnostic bronchoscopy (12/6) with bronchial washing.  Patient was maintained on oxygen via NC at 3 to 4 L, steroid, bronchodilator, and doxycycline.  Hospital course has been relatively uneventful except for hypoxia.  6-minute walk (12/7) showed that patient will require home oxygen.   was consulted to assist with arranging home oxygen via NC at 3 to 4 L.  Otherwise, she remained afebrile with stable hemodynamics throughout hospital course.  She's to continue on tapered steroid and doxycycline as outlined in discharge summary.      DISCHARGE Follow Up Recommendations for labs and diagnostics:   1.  Follow with PCP, pulmonologist as schedule  2.  Patient to continue on tapered steroid      Reasons For Change In Medications and Indications for New Medications:      Day of Discharge     Vital Signs:  Temp:  [97.9 °F (36.6 °C)-98.1 °F (36.7 °C)] 98 °F (36.7 °C)  Heart Rate:  [59-87] 87  Resp:  [17-20] 19  BP: ()/(48-70) 103/70  Flow (L/min):  [4] 4    Physical Exam  Constitutional:       General: She is not in acute distress.     Appearance: She is not ill-appearing.      Comments: Frail-appearing female   HENT:      Head: Normocephalic.      Right Ear: Tympanic membrane normal.      Nose: Nose normal.      Mouth/Throat:      Mouth: Mucous membranes are moist.   Cardiovascular:      Rate and Rhythm: Normal rate.      Pulses: Normal pulses.   Pulmonary:      Effort: Pulmonary effort is normal.  No expiratory wheezing, rales or crackles.  Abdominal:      Palpations: Abdomen is soft.   Genitourinary:     Rectum: Normal.   Musculoskeletal:         General: No swelling or tenderness. Normal range of motion.      Cervical back: Neck supple.   Skin:     General: Skin is warm.   Neurological:      General: No focal deficit present.      Mental Status:  She is alert.     Pertinent  and/or Most Recent Results     LAB RESULTS:      Lab 12/07/22  0547 12/06/22  0519 12/05/22  0555 12/04/22  2223   WBC 13.30* 13.80* 9.80 8.20   HEMOGLOBIN 13.0 13.0 14.2 14.1   HEMATOCRIT 40.4 38.7 43.9 42.1   PLATELETS 376 257 291 284   NEUTROS ABS 11.10* 10.50* 9.00* 6.20   LYMPHS ABS 1.40 2.20 0.60* 1.30   MONOS ABS 0.70 1.00* 0.20 0.60   EOS ABS 0.00 0.00 0.00 0.00   MCV 95.3 93.0 95.5 93.7         Lab 12/07/22  0547 12/06/22  0519 12/05/22  0555 12/04/22  2223   SODIUM 139 136 134* 135*   POTASSIUM 5.0 4.7 4.5 4.4   CHLORIDE 100 100 97* 100   CO2 27.0 26.0 25.0 25.0   ANION GAP 12.0 10.0 12.0 10.0   BUN 10 12 6* 8   CREATININE 0.58 0.66 0.64 0.70   EGFR 101.8 98.7 99.4 97.3   GLUCOSE 97 90 160* 102*   CALCIUM 8.9 8.7 8.7 8.4*         Lab 12/04/22  2223   TOTAL PROTEIN 6.3   ALBUMIN 3.70   GLOBULIN 2.6   ALT (SGPT) 16   AST (SGOT) 24   BILIRUBIN <0.2   ALK PHOS 57         Lab 12/04/22  2223   PROBNP 171.6   TROPONIN T <0.010                 Lab 12/04/22  2259   PH, ARTERIAL 7.412   PCO2, ARTERIAL 42.7   PO2 ART 74.2*   O2 SATURATION ART 94.8   FIO2 32   HCO3 ART 27.2   BASE EXCESS ART 2.2     Brief Urine Lab Results     None        Microbiology Results (last 10 days)     Procedure Component Value - Date/Time    AFB Culture - Wash, Lung, L [[id number]] Collected: 12/06/22 1318    Lab Status: Preliminary result Specimen: Wash from Lung, L Updated: 12/07/22 1043     AFB Stain No acid fast bacilli seen on concentrated smear    Respiratory Culture - Wash, Lung, L [[id number]] Collected: 12/06/22 1318    Lab Status: Preliminary result Specimen: Wash from Lung, L Updated: 12/07/22 1023     Respiratory Culture Scant growth (1+) The culture consists of normal respiratory caridad. This is a preliminary report; final report to follow.     Gram Stain Few (2+) WBCs seen      Few (2+) Mixed bacterial morphotypes seen on Gram Stain    Respiratory Panel PCR w/COVID-19(SARS-CoV-2)  MERRY/LAUREN/CARIN/PAD/COR/MAD/JOSE CARLOS In-House, NP Swab in UTM/VTM, 3-4 HR TAT - Wash, Lung, L [[id number]]  (Abnormal) Collected: 12/06/22 1318    Lab Status: Final result Specimen: Wash from Lung, L Updated: 12/06/22 1447     ADENOVIRUS, PCR Not Detected     Coronavirus 229E Not Detected     Coronavirus HKU1 Not Detected     Coronavirus NL63 Not Detected     Coronavirus OC43 Not Detected     COVID19 Not Detected     Human Metapneumovirus Not Detected     Human Rhinovirus/Enterovirus Not Detected     Influenza A H3 Detected     Influenza B PCR Not Detected     Parainfluenza Virus 1 Not Detected     Parainfluenza Virus 2 Not Detected     Parainfluenza Virus 3 Not Detected     Parainfluenza Virus 4 Not Detected     RSV, PCR Not Detected     Bordetella pertussis pcr Not Detected     Bordetella parapertussis PCR Not Detected     Chlamydophila pneumoniae PCR Not Detected     Mycoplasma pneumo by PCR Not Detected    Narrative:      In the setting of a positive respiratory panel with a viral infection PLUS a negative procalcitonin without other underlying concern for bacterial infection, consider observing off antibiotics or discontinuation of antibiotics and continue supportive care. If the respiratory panel is positive for atypical bacterial infection (Bordetella pertussis, Chlamydophila pneumoniae, or Mycoplasma pneumoniae), consider antibiotic de-escalation to target atypical bacterial infection.    Respiratory Panel PCR w/COVID-19(SARS-CoV-2) MERRY/LAUREN/CARIN/PAD/COR/MAD/JOSE CARLOS In-House, NP Swab in UTM/VTM, 3-4 HR TAT - Swab, Nasopharynx [[id number]]  (Normal) Collected: 12/04/22 2236    Lab Status: Final result Specimen: Swab from Nasopharynx Updated: 12/04/22 2353     ADENOVIRUS, PCR Not Detected     Coronavirus 229E Not Detected     Coronavirus HKU1 Not Detected     Coronavirus NL63 Not Detected     Coronavirus OC43 Not Detected     COVID19 Not Detected     Human Metapneumovirus Not Detected     Human Rhinovirus/Enterovirus Not  Detected     Influenza A PCR Not Detected     Influenza A H1 Not Detected     Influenza A H1 2009 PCR Not Detected     Influenza A H3 Not Detected     Influenza B PCR Not Detected     Parainfluenza Virus 1 Not Detected     Parainfluenza Virus 2 Not Detected     Parainfluenza Virus 3 Not Detected     Parainfluenza Virus 4 Not Detected     RSV, PCR Not Detected     Bordetella pertussis pcr Not Detected     Bordetella parapertussis PCR Not Detected     Chlamydophila pneumoniae PCR Not Detected     Mycoplasma pneumo by PCR Not Detected    Narrative:      In the setting of a positive respiratory panel with a viral infection PLUS a negative procalcitonin without other underlying concern for bacterial infection, consider observing off antibiotics or discontinuation of antibiotics and continue supportive care. If the respiratory panel is positive for atypical bacterial infection (Bordetella pertussis, Chlamydophila pneumoniae, or Mycoplasma pneumoniae), consider antibiotic de-escalation to target atypical bacterial infection.          CT Chest Without Contrast Diagnostic    Result Date: 12/6/2022  Impression: 1. Multifocal tree in bud nodularity likely representing infectious or inflammatory bronchiolitis. Mild focal pneumonia within the left lower lobe. 2. Mild basilar predominant bronchiectasis.    Electronically Signed By-Joey Suarez MD On:12/6/2022 10:51 AM This report was finalized on [id number] by  Joey Suarez MD.    XR Chest 1 View    Result Date: 12/5/2022  Impression: No acute process.  Electronically Signed By-Juan A Whitman MD On:12/5/2022 7:38 AM This report was finalized on [id number] by  Juan A Whitman MD.                  Labs Pending at Discharge:  Pending Labs     Order Current Status    Fungus Culture - Wash, Lung, L In process    Non-gynecologic Cytology In process    Pneumocystis PCR - Wash, Lung, L In process    AFB Culture - Wash, Lung, L Preliminary result    Respiratory Culture - Wash,  Lung, L Preliminary result          Procedures Performed  Procedure(s):  BRONCHOSCOPY WITH BRONCHIAL WASHING  12/06 1253 BRONCHOSCOPY      Consults:   Consults     Date and Time Order Name Status Description    12/5/2022  1:50 AM Inpatient Pulmonology Consult Completed     12/5/2022 12:15 AM Hospitalist (on-call MD unless specified)              Discharge Details        Discharge Medications      Continue These Medications      Instructions Start Date   Fluticasone-Umeclidin-Vilant 100-62.5-25 MCG/ACT inhaler  Commonly known as: TRELEGY   1 puff, Inhalation, Daily      metoprolol succinate XL 25 MG 24 hr tablet  Commonly known as: TOPROL-XL   TAKE 1 TABLET AT BEDTIME         ASK your doctor about these medications      Instructions Start Date   citalopram 40 MG tablet  Commonly known as: CeleXA   40 mg, Oral, Daily             No Known Allergies      Discharge Disposition: Home      Diet:  Hospital:  Diet Order   Procedures   • Diet: Cardiac Diets; Healthy Heart (2-3 Na+); Texture: Regular Texture (IDDSI 7); Fluid Consistency: Thin (IDDSI 0)         Discharge Activity: Ad juanita.    CODE STATUS:  Code Status and Medical Interventions:   Ordered at: 12/05/22 0059     Code Status (Patient has no pulse and is not breathing):    CPR (Attempt to Resuscitate)     Medical Interventions (Patient has pulse or is breathing):    Full Support         No future appointments.    Time spent on Discharge including face to face service:  > 30 minutes

## 2022-12-07 NOTE — PLAN OF CARE
Goal Outcome Evaluation:  Plan of Care Reviewed With: patient        Progress: improving   Patient states feeling much better, Continues with SOAOE, requiring 4 liters NC, Rested well with no pain complaints.  at bedside.

## 2022-12-08 LAB
BACTERIA SPEC RESP CULT: NORMAL
GRAM STN SPEC: NORMAL
GRAM STN SPEC: NORMAL
LAB AP CASE REPORT: NORMAL
PATH REPORT.FINAL DX SPEC: NORMAL
PATH REPORT.GROSS SPEC: NORMAL

## 2022-12-08 NOTE — CASE MANAGEMENT/SOCIAL WORK
Case Management Discharge Note      Final Note: Home         Selected Continued Care - Discharged on 12/7/2022 Admission date: 12/4/2022 - Discharge disposition: Home or Self Care            Transportation Services  Private: Car    Final Discharge Disposition Code: 01 - home or self-care

## 2022-12-08 NOTE — OUTREACH NOTE
Prep Survey    Flowsheet Row Responses   [facility] facility patient discharged from? [facility]   Is LACE score < 7 ? No   Emergency Room discharge w/ pulse ox? No   Eligibility Readm Mgmt   Discharge diagnosis COPD exacerbation   Does the patient have one of the following disease processes/diagnoses(primary or secondary)? COPD   Does the patient have Home health ordered? No   Is there a DME ordered? Yes   What DME was ordered? [employer] [facility] - O2 pending at d/c   Prep survey completed? Yes          VAMSHI MARIE - Registered Nurse

## 2022-12-09 LAB
P JIROVECII DNA L RESP QL NAA+NON-PROBE: NEGATIVE
REF LAB TEST METHOD: NORMAL

## 2022-12-13 ENCOUNTER — READMISSION MANAGEMENT (OUTPATIENT)
Dept: CALL CENTER | Facility: HOSPITAL | Age: 62
End: 2022-12-13

## 2022-12-13 NOTE — OUTREACH NOTE
COPD/PN Week 1 Survey    Flowsheet Row Responses   [facility] patient discharged from? [facility]   Does the patient have one of the following disease processes/diagnoses(primary or secondary)? COPD   Week 1 attempt successful? Yes   Call start time 1518   Call end time 1529   Discharge diagnosis COPD exacerbation   Is patient permission given to speak with other caregiver? Yes   List who call center can speak with CHA LLANOSN Spouse    Person spoke with today (if not patient) and relationship ROMI RODY Spouse    Meds reviewed with patient/caregiver? Yes   Does the patient have all medications ordered at discharge? Yes   Is the patient taking all medications as directed (includes completed medication regime)? Yes   Does the patient have a primary care provider?  Yes   Does the patient have an appointment with their PCP or specialist within 7 days of discharge? No   Comments regarding PCP Follow up with Marito Velasquez MD PCP   Nursing Interventions Educated patient on importance of making appointment, Advised patient to make appointment  [Advised to do f/u with PCP post hospitalization. ]   Has the patient kept scheduled appointments due by today? N/A   Comments  reports that it will be a few weeks until patient has f/u with Dr Bradford.    Has home health visited the patient within 72 hours of discharge? N/A   Has all DME been delivered? Yes   Pulse Ox monitoring Intermittent   Pulse Ox device source Patient   O2 Sat comments Gave reading of 92% without O2 with exertion and higher 90's at rest.    O2 Sat: education provided Sat levels, Monitoring frequency, When to seek care   Psychosocial issues? No   Did the patient receive a copy of their discharge instructions? Yes   Nursing interventions Reviewed instructions with patient   What is the patient's perception of their health status since discharge? Improving   If the patient is a current smoker, are they able to teach back resources for  cessation? Not a smoker   Is the patient/caregiver able to teach back the hierarchy of who to call/visit for symptoms/problems? PCP, Specialist, Home health nurse, Urgent Care, ED, 911 Yes   Is the patient able to teach back COPD zones? No   Nursing interventions Education provided on various zones   Patient reports what zone on this call? Green Zone   Green Zone Reports doing well  [ reports patient able to be weaned off of O2, showing good progress. ]   Green Zone interventions: Take daily medications, Use oxygen as prescribed, Continue regular exercise/diet plan   Week 1 call completed? Yes          EEMRITA PATEL - Registered Nurse

## 2022-12-21 ENCOUNTER — READMISSION MANAGEMENT (OUTPATIENT)
Dept: CALL CENTER | Facility: HOSPITAL | Age: 62
End: 2022-12-21

## 2022-12-21 NOTE — OUTREACH NOTE
COPD/PN Week 2 Survey    Flowsheet Row Responses   [facility] patient discharged from? [facility]   Does the patient have one of the following disease processes/diagnoses(primary or secondary)? COPD   Week 2 attempt successful? Yes   Call start time 1537   Call end time 1538   Discharge diagnosis COPD exacerbation   Person spoke with today (if not patient) and relationship RODY LLANOS Spouse    Meds reviewed with patient/caregiver? Yes   Does the patient have all medications ordered at discharge? Yes   Is the patient taking all medications as directed (includes completed medication regime)? Yes   Medication comments ATBs steroids completed   Has the patient kept scheduled appointments due by today? Yes   Psychosocial issues? No   What is the patient's perception of their health status since discharge? Improving  [Spoke briefly with  who communicates that pt is now off her O2 and is doing well--had  few issues over the weekend as they stayed in a hotel with poor air flow but seems to be better now. ]   Week 2 call completed? Yes          BEULAH ERWIN - Registered Nurse

## 2023-01-05 LAB — FUNGUS WND CULT: ABNORMAL

## 2023-01-17 LAB
MYCOBACTERIUM SPEC CULT: NORMAL
NIGHT BLUE STAIN TISS: NORMAL

## (undated) DEVICE — BAPTIST FLOYD BRONCHOSCOPY: Brand: MEDLINE INDUSTRIES, INC.